# Patient Record
Sex: FEMALE | Race: WHITE | Employment: OTHER | URBAN - METROPOLITAN AREA
[De-identification: names, ages, dates, MRNs, and addresses within clinical notes are randomized per-mention and may not be internally consistent; named-entity substitution may affect disease eponyms.]

---

## 2017-02-13 ENCOUNTER — HOSPITAL ENCOUNTER (OUTPATIENT)
Dept: MAMMOGRAPHY | Age: 72
Discharge: HOME | End: 2017-02-13
Attending: SPECIALIST
Payer: MEDICARE

## 2017-02-13 DIAGNOSIS — N60.92 ATYPICAL DUCTAL HYPERPLASIA OF LEFT BREAST: ICD-10-CM

## 2017-02-13 PROCEDURE — 77066 DX MAMMO INCL CAD BI: CPT

## 2017-02-16 ENCOUNTER — OFFICE VISIT (OUTPATIENT)
Dept: SURGERY | Age: 72
End: 2017-02-16

## 2017-02-16 VITALS
HEIGHT: 63 IN | OXYGEN SATURATION: 97 % | TEMPERATURE: 95.2 F | RESPIRATION RATE: 18 BRPM | WEIGHT: 226.4 LBS | DIASTOLIC BLOOD PRESSURE: 61 MMHG | BODY MASS INDEX: 40.11 KG/M2 | SYSTOLIC BLOOD PRESSURE: 145 MMHG | HEART RATE: 86 BPM

## 2017-02-16 DIAGNOSIS — N60.91 ATYPICAL LOBULAR HYPERPLASIA OF RIGHT BREAST: ICD-10-CM

## 2017-02-16 DIAGNOSIS — N60.92 ATYPICAL DUCTAL HYPERPLASIA OF LEFT BREAST: Primary | ICD-10-CM

## 2017-02-16 NOTE — PATIENT INSTRUCTIONS
If you have any questions or concerns about today's appointment, the verbal and/or written instructions you were given for follow up care, please call our office at [phone].     [facility] - [address]    [phone] office  [phone]

## 2017-02-16 NOTE — PROGRESS NOTES
[facility] comes to the office for followup. The patient had a reduction mammoplasty done in October of 2016. The pathology report on that specimen revealed some atypical ductal hyperplasia on the left with a papilloma and on the right some atypical lobular hyperplasia with a papilloma. In my discussion with the pathologist it was felt that these were widely excised by B. Reduction mammoplasty. The patient has done well without any breast problems or lumps developing. She has minimal soreness at this time. Apparently some of the Vicryl stitches are \"spitting\". She did undergo a recent bilateral mammogram which failed to show any suspicious areas but just changes consistent with her recent surgery. Allergies   Allergen Reactions    Codeine Swelling    Cortisone Palpitations     Injections    Pcn [Penicillins] Hives       Current Outpatient Prescriptions   Medication Sig Dispense Refill    omeprazole (PRILOSEC) 10 mg capsule Take 10 mg by mouth daily.  aspirin delayed-release 81 mg tablet Take  by mouth daily.  NIFEdipine (PROCARDIA) 10 mg capsule Take 10 mg by mouth daily as needed.  hydrochlorothiazide (HYDRODIURIL) 50 mg tablet Take 50 mg by mouth daily.  FOLIC ACID PO Take  by mouth daily.  cholecalciferol, vitamin D3, (VITAMIN D3) 2,000 unit tab Take  by mouth daily.  olmesartan (BENICAR) 40 mg tablet Take 40 mg by mouth daily.  omega-3 fatty acids-vitamin e (FISH OIL) 1,000 mg cap Take 1 Cap by mouth daily.  multivitamin (ONE A DAY) tablet Take 1 Tab by mouth daily.  calcium 500 mg tab Take 500 mg by mouth daily.  NIFEdipine ER (NIFEDICAL XL) 30 mg ER tablet Take 30 mg by mouth daily.  solifenacin (VESICARE) 5 mg tablet Take 1 Tab by mouth daily.  90 Tab 3       Past Medical History   Diagnosis Date    Adverse effect of anesthesia      BLOOD PRESSURE DROPS    Colon polyps     Hypertension     Nausea & vomiting     Nocturia     Osteopenia  Stress incontinence        Past Surgical History   Procedure Laterality Date    Hx colonoscopy      Us guided core breast biopsy       right  benign    Hx hysterectomy      Hx bladder suspension      Hx knee arthroscopy       Right    Hx ankle fracture tx       left    Hx breast reduction Bilateral 10/19/2016     bilateral inferior pedicle BREAST REDUCTION performed by Yumiko Love MD at [address] Hx breast reconstruction Bilateral 11/2016    Hx oophorectomy          Family History   Problem Relation Age of Onset    Colon Cancer Mother     Hypertension Mother     Cancer Father      BONE AND TESTICULAR     Hypertension Father     Hypertension Brother        Social History     Social History    Marital status:      Spouse name: N/A    Number of children: N/A    Years of education: N/A     Occupational History    Not on file. Social History Main Topics    Smoking status: Never Smoker    Smokeless tobacco: Never Used    Alcohol use No    Drug use: No    Sexual activity: Yes     Partners: Male     Other Topics Concern    Not on file     Social History Narrative      The patient's review of systems has not changed since her initial visit a couple of months ago. PHYSICAL EXAM    Visit Vitals    /61 (BP 1 Location: Right arm, BP Patient Position: Sitting)    Pulse 86    Temp 95.2 °F (35.1 °C) (Oral)    Resp 18    Ht 5' 3\" (1.6 m)    Wt 102.7 kg (226 lb 6.4 oz)    SpO2 97%    BMI 40.1 kg/m2     Constitutional:  Well-developed, well-nourished, no acute distress. Head:  Head, eyes, ears, nose, throat within normal limits. Skin:  No suspicious moles or rashes. Neck:  No masses or adenopathy. The airway appears normal. Thyroid is not enlarged and there are no palpable thyroid nodules. Lungs:  Lungs are clear to auscultation and percussion. No respiratory distress. No chest wall tenderness.     Heart:  Heart is regular with no extra heart sounds or murmur heard. Breast Exam: The breasts are free of any discrete tenderness or masses. The skin and nipple areas appear normal. Both axillae are negative. The patient has several scars in both breast from her relatively recent reduction mammoplasty. There couple of small areas of redness in each of the incisions related to her \"spitting\" of Vicryl. But no masses or tenderness. Abdomen: The abdomen is soft and nontender without organomegaly or masses. Bowel sounds are active and of normal  pitch. There is no abdominal distention. No hernias are evident. Extremities:  No tenderness of the extremities and no significant swelling. Psych:  Alert and oriented. ASSESSMENT:Recent bilateral reduction mammoplasties with pathologic evaluation showing atypical ductal hyperplasia and papilloma on the left breast and atypical lobular hyperplasia with papilloma of the right breast. Areas were adequately excised. #2 hypertension. #3 history of colon polyps and family history of colon cancer    RECOMMENDATIONS:I will plan to see the patient in a year. We again discussed the increased risk factor of atypical ductal hyperplasia and the need for her to continue yearly mammograms, self breast examinations, and a yearly physician physical exam. 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 We had not previously discussed the possibility of taking tamoxifen to help reduce her risk. I will plan to discuss this with her. Bolivar Davila MD  Please note: This document has been produced using voice recognition software. Unrecognized errors in transcription may be present.

## 2017-02-16 NOTE — PROGRESS NOTES
I did call and talk to the patient about the prophylactic use of tamoxifen for high risk patients for breast cancer. Since she has ADH she does meet criteria for high risk. We discussed the function of tamoxifen, How it works, and side effects. She has had a previous hysterectomy. But I did mention the possibility of blood clots. The patient wants to think about this and will get back in touch with us.     Vj Conrad MD  2/16/2017  [id number]

## 2017-02-16 NOTE — PROGRESS NOTES
Patient is a 73 y.o. female who presents for a follow up breast exam for atypical ductal hyperplasia of the left breast & atypical lobular hyperplasia of the right breast s/p a breast reduction on 10/19/16. Patient denies any new breast changes or areas of concern today. 1. Have you been to the ER, urgent care clinic since your last visit? Hospitalized since your last visit? No    2. Have you seen or consulted any other health care providers outside of the [address] [place] [address] since your last visit? Include any pap smears or colon screening.  Yes, urologist.

## 2017-02-28 DIAGNOSIS — N60.91 ATYPICAL LOBULAR HYPERPLASIA OF RIGHT BREAST: Primary | ICD-10-CM

## 2017-02-28 RX ORDER — TAMOXIFEN CITRATE 20 MG/1
20 TABLET ORAL DAILY
Qty: 90 TAB | Refills: 3 | Status: SHIPPED | OUTPATIENT
Start: 2017-02-28 | End: 2018-03-06 | Stop reason: SDUPTHER

## 2017-08-08 ENCOUNTER — HOSPITAL ENCOUNTER (OUTPATIENT)
Dept: GENERAL RADIOLOGY | Age: 72
Discharge: HOME | End: 2017-08-08
Attending: INTERNAL MEDICINE
Payer: MEDICARE

## 2017-08-08 DIAGNOSIS — Z78.0 ASYMPTOMATIC MENOPAUSAL STATE: ICD-10-CM

## 2017-08-08 PROCEDURE — 77080 DXA BONE DENSITY AXIAL: CPT

## 2018-01-17 ENCOUNTER — TELEPHONE (OUTPATIENT)
Dept: SURGERY | Age: 73
End: 2018-01-17

## 2018-01-17 DIAGNOSIS — N60.92 ATYPICAL DUCTAL HYPERPLASIA OF LEFT BREAST: Primary | ICD-10-CM

## 2018-01-17 DIAGNOSIS — N60.91 ATYPICAL LOBULAR HYPERPLASIA OF RIGHT BREAST: ICD-10-CM

## 2018-01-17 DIAGNOSIS — Z12.31 ENCOUNTER FOR SCREENING MAMMOGRAM FOR HIGH-RISK PATIENT: ICD-10-CM

## 2018-02-14 ENCOUNTER — HOSPITAL ENCOUNTER (OUTPATIENT)
Dept: MAMMOGRAPHY | Age: 73
Discharge: HOME | End: 2018-02-14
Attending: SPECIALIST
Payer: MEDICARE

## 2018-02-14 DIAGNOSIS — Z12.31 ENCOUNTER FOR SCREENING MAMMOGRAM FOR HIGH-RISK PATIENT: ICD-10-CM

## 2018-02-14 DIAGNOSIS — N60.92 ATYPICAL DUCTAL HYPERPLASIA OF LEFT BREAST: ICD-10-CM

## 2018-02-14 DIAGNOSIS — N60.91 ATYPICAL LOBULAR HYPERPLASIA OF RIGHT BREAST: ICD-10-CM

## 2018-02-14 PROCEDURE — 77063 BREAST TOMOSYNTHESIS BI: CPT

## 2018-02-20 ENCOUNTER — OFFICE VISIT (OUTPATIENT)
Dept: SURGERY | Age: 73
End: 2018-02-20

## 2018-02-20 VITALS
SYSTOLIC BLOOD PRESSURE: 160 MMHG | DIASTOLIC BLOOD PRESSURE: 79 MMHG | BODY MASS INDEX: 40.96 KG/M2 | WEIGHT: 231.2 LBS | HEART RATE: 78 BPM | OXYGEN SATURATION: 98 % | TEMPERATURE: 98 F | HEIGHT: 63 IN

## 2018-02-20 DIAGNOSIS — I11.9 HYPERTENSIVE HEART DISEASE WITHOUT HEART FAILURE: ICD-10-CM

## 2018-02-20 DIAGNOSIS — N60.91 ATYPICAL LOBULAR HYPERPLASIA OF RIGHT BREAST: ICD-10-CM

## 2018-02-20 DIAGNOSIS — N60.92 ATYPICAL DUCTAL HYPERPLASIA OF LEFT BREAST: Primary | ICD-10-CM

## 2018-02-20 PROBLEM — E66.01 OBESITY, MORBID (HCC): Status: ACTIVE | Noted: 2018-02-20

## 2018-02-20 NOTE — PROGRESS NOTES
Seth Cobos underwent bilateral reduction mammoplasties in October 2016. In the left breast she was noted to have a focal area of atypical ductal hyperplasia with a papilloma. In the right breast she was noted to have an area of atypical lobular and ductal hyperplasia with a papilloma. I talked with the pathologist and it was felt that both these areas were widely excised. The patient has never had any other previous breast problems. Since that time I have encouraged the patient to do bilateral self breast examinations monthly. She has not noticed any new changes or lumps or tenderness. In addition I have encouraged her to have at least a yearly physical examination of her breasts as well as mammogram.  She had a 3D mammogram on 2/14/18 and it was interpreted as negative for any suspicious areas. After talking with the patient we decided because of her high risk status to place her on tamoxifen as a prophylactic treatment. She has been tolerating this well without any difficulty.     Allergies   Allergen Reactions    Codeine Swelling    Cortisone Palpitations     Injections    Pcn [Penicillins] Hives     Past Medical History:   Diagnosis Date    Adverse effect of anesthesia     BLOOD PRESSURE DROPS    Colon polyps     Hypertension     Nausea & vomiting     Nocturia     Osteopenia     Stress incontinence      Past Surgical History:   Procedure Laterality Date    HX ANKLE FRACTURE TX      left    HX BLADDER SUSPENSION      HX BREAST RECONSTRUCTION Bilateral 11/2016    HX BREAST REDUCTION Bilateral 10/19/2016    bilateral inferior pedicle BREAST REDUCTION performed by Jason Alexander MD at [facility] OR    HX COLONOSCOPY      HX HYSTERECTOMY      HX KNEE ARTHROSCOPY      Right    HX OOPHORECTOMY      US GUIDED CORE BREAST BIOPSY      right  benign     Social History     Social History    Marital status:      Spouse name: N/A    Number of children: N/A    Years of education: N/A     Social History Main Topics    Smoking status: Never Smoker    Smokeless tobacco: Never Used    Alcohol use No    Drug use: No    Sexual activity: Yes     Partners: Male     Other Topics Concern    None     Social History Narrative     The patient's review of systems has not changed. She continues to be on medication for hypovitaminosis D, hypertension, and GERD. Her blood pressure was up today but she has been taking her blood pressure medicines. PHYSICAL EXAM   syst  Visit Vitals    Temp 98 °F (36.7 °C) (Oral)    Ht 5' 3\" (1.6 m)    Wt 104.9 kg (231 lb 3.2 oz)    SpO2 98%    BMI 40.96 kg/m2          Constitutional:  Well-developed, well-nourished, no acute distress. Head:  Head, eyes, ears, nose, throat within normal limits. Skin:  No suspicious moles or rashes. Neck:  No masses or adenopathy. The airway appears normal. Thyroid is not enlarged and there are no palpable thyroid nodules. Lungs:  Lungs are clear to auscultation and percussion. No respiratory distress. No chest wall tenderness. Heart:  Heart is regular with no extra heart sounds or murmur heard. Breast Exam: The breasts are free of any discrete tenderness or masses  The skin and nipple areas appear normal. Both axillae are negative. The patient has scars from previous bilateral reduction mammoplasties. In the right breast mostly just to the lateral inferior part of the edge of the areola she does have some thickening. In the left breast she has much more thickening around the nipple areolar complex and just slightly lateral to that. I suspect this is all from scarring. Abdomen: The abdomen is soft and nontender without organomegaly or masses. Bowel sounds are active and of normal pitch. There is no abdominal distention. No hernias are evident. Extremities:  No tenderness of the extremities and no significant swelling.   Today she has 1-2+ edema pretibial.     Psych:  Alert and oriented. Assessment: Previous bilateral reduction mammoplasty in October 2016 with the finding of atypical ductal hyperplasia of the right breast and ADH and atypical lobular hyperplasia in the left breast.  Recent mammogram appear negative. Patient taking tamoxifen with the idea of keeping her on it for a total of 5 years. #2 hypertension. #3 GERD. Recommendations: Patient is to return in 1 year for further follow-up. She is to continue self breast examinations. I will plan to see her back in a year. The above was dictated with Chon. There may be unrecognized errors.     Roney Mortimer MD

## 2018-02-20 NOTE — PATIENT INSTRUCTIONS
If you have any questions or concerns about today's appointment, the verbal and/or written instructions you were given for follow up care, please call our office at [phone].     [facility] - [address]    [phone] office  [phone]

## 2018-02-20 NOTE — MR AVS SNAPSHOT
[facility] 
 
 
 [address] [address] [address] 
[phone] Patient: Hakeem Resendez MRN: [id number] Visit Information Date & Time Provider Department Dept. Phone Encounter #  
 2/20/2018  1:00 PM Niyah Solitario MD [address] [id number] Follow-up Instructions Return in about 1 year (around 2/20/2019). Upcoming Health Maintenance Date Due Hepatitis C Screening 1945 DTaP/Tdap/Td series (1 - Tdap) 9/11/1968 FOBT Q 1 YEAR AGE 50-75 9/11/1997 ZOSTER VACCINE AGE 60> 7/11/2007 GLAUCOMA SCREENING Q2Y 9/11/2012 Pneumococcal 65+ Low/Medium Risk (1 of 2 - PCV13) 9/11/2012 MEDICARE YEARLY EXAM 9/11/2012 Influenza Age 5 to Adult 8/1/2017 BREAST CANCER SCRN MAMMOGRAM 2/14/2020 Allergies as of 2/20/2018  Review Complete On: 2/20/2018 By: Niyah Solitario MD  
  
 Severity Noted Reaction Type Reactions Codeine  06/20/2013   Side Effect Swelling Cortisone  06/20/2013   Side Effect Palpitations Injections Pcn [Penicillins]  06/20/2013   Side Effect Hives Current Immunizations  Never Reviewed No immunizations on file. Not reviewed this visit You Were Diagnosed With   
  
 Codes Comments Atypical ductal hyperplasia of left breast    -  Primary ICD-10-CM: N60.92 
ICD-9-CM: 610.8 Atypical lobular hyperplasia of right breast     ICD-10-CM: N60.91 
ICD-9-CM: 610.8 Hypertensive heart disease without heart failure     ICD-10-CM: I11.9 ICD-9-CM: 402.90 Vitals Temp Height(growth percentile) Weight(growth percentile) SpO2 BMI OB Status 98 °F (36.7 °C) (Oral) 5' 3\" (1.6 m) 231 lb 3.2 oz (104.9 kg) 98% 40.96 kg/m2 Hysterectomy Smoking Status Never Smoker BMI and BSA Data Body Mass Index Body Surface Area 40.96 kg/m 2 2.16 m 2 Preferred Pharmacy Pharmacy Name Phone [address], [facility] [phone] Your Updated Medication List  
  
   
This list is accurate as of: 2/20/18  1:22 PM.  Always use your most recent med list.  
  
  
  
  
 aspirin delayed-release 81 mg tablet Take  by mouth daily. BENICAR 40 mg tablet Generic drug:  olmesartan Take 40 mg by mouth daily. calcium 500 mg Tab Take 500 mg by mouth daily. FISH OIL 1,000 mg Cap Generic drug:  omega-3 fatty acids-vitamin e Take 1 Cap by mouth daily. FOLIC ACID PO Take  by mouth daily. hydroCHLOROthiazide 50 mg tablet Commonly known as:  HYDRODIURIL Take 50 mg by mouth daily. multivitamin tablet Commonly known as:  ONE A DAY Take 1 Tab by mouth daily. * NIFEDICAL XL 30 mg ER tablet Generic drug:  NIFEdipine ER Take 30 mg by mouth daily. * NIFEdipine 10 mg capsule Commonly known as:  Therese Key Take 10 mg by mouth daily as needed. PriLOSEC 10 mg capsule Generic drug:  omeprazole Take 10 mg by mouth daily. * solifenacin 5 mg tablet Commonly known as:  Pageland Kobs Take 1 Tab by mouth daily. * solifenacin 10 mg tablet Commonly known as:  Pageland Kobs Take 1 Tab by mouth daily. tamoxifen 20 mg tablet Commonly known as:  NOLVADEX Take 1 Tab by mouth daily. tolterodine ER 4 mg ER capsule Commonly known as:  Adriana Kumarel Take 1 Cap by mouth daily. VITAMIN D3 2,000 unit Tab Generic drug:  cholecalciferol (vitamin D3) Take  by mouth daily. * Notice: This list has 4 medication(s) that are the same as other medications prescribed for you. Read the directions carefully, and ask your doctor or other care provider to review them with you. Follow-up Instructions Return in about 1 year (around 2/20/2019). Patient Instructions If you have any questions or concerns about today's appointment, the verbal and/or written instructions you were given for follow up care, please call our office at [phone]. [facility] - [facility] [address] [place] [address], [address] [place], [place] [phone] office [phone] Introducing [facility]! Louise Feng introduces "[employer] patient portal. Now you can access parts of your medical record, email your doctor's office, and request medication refills online. 1. In your internet browser, go to [email or web address] 2. Click on the First Time User? Click Here link in the Sign In box. You will see the New Member Sign Up page. 3. Enter your "[employer] Access Code exactly as it appears below. You will not need to use this code after youve completed the sign-up process. If you do not sign up before the expiration date, you must request a new code. · "[employer] Access Code: [email or web address] Expires: 4/18/2018 12:56 PM 
 
4. Enter the last four digits of your Social Security Number (xxxx) and Date of Birth (mm/dd/yyyy) as indicated and click Submit. You will be taken to the next sign-up page. 5. Create a "[employer] ID. This will be your "[employer] login ID and cannot be changed, so think of one that is secure and easy to remember. 6. Create a "[employer] password. You can change your password at any time. 7. Enter your Password Reset Question and Answer. This can be used at a later time if you forget your password. 8. Enter your e-mail address. You will receive e-mail notification when new information is available in [address] 9. Click Sign Up. You can now view and download portions of your medical record. 10. Click the Download Summary menu link to download a portable copy of your medical information. If you have questions, please visit the Frequently Asked Questions section of the "[employer] website. Remember, "[employer] is NOT to be used for urgent needs. For medical emergencies, dial 911. Now available from your iPhone and Android! Please provide this summary of care documentation to your next provider. Your primary care clinician is listed as NONE. If you have any questions after today's visit, please call [phone].

## 2018-02-20 NOTE — PROGRESS NOTES
Chief Complaint   Patient presents with    Follow-up     follow up breast exam atypical ductal hyperplasia of left breast and atypical lobular hyperplasia of right breast 10/2016 mammogram images done 1/17/18. No pain associated today. 1. Have you been to the ER, urgent care clinic since your last visit? Hospitalized since your last visit? No    2. Have you seen or consulted any other health care providers outside of the [address] [place] [address] since your last visit? Include any pap smears or colon screening.  No

## 2018-03-06 DIAGNOSIS — N60.91 ATYPICAL LOBULAR HYPERPLASIA OF RIGHT BREAST: ICD-10-CM

## 2018-03-06 RX ORDER — TAMOXIFEN CITRATE 20 MG/1
20 TABLET ORAL DAILY
Qty: 90 TAB | Refills: 3 | Status: SHIPPED | OUTPATIENT
Start: 2018-03-06 | End: 2019-02-01 | Stop reason: SDUPTHER

## 2018-05-15 PROBLEM — I10 ESSENTIAL HYPERTENSION: Status: ACTIVE | Noted: 2018-01-19

## 2018-05-15 PROBLEM — D05.12 DUCTAL CARCINOMA IN SITU (DCIS) OF BOTH BREASTS: Status: ACTIVE | Noted: 2018-01-19

## 2018-05-15 PROBLEM — E78.5 DYSLIPIDEMIA: Status: ACTIVE | Noted: 2018-01-19

## 2018-05-15 PROBLEM — D05.11 DUCTAL CARCINOMA IN SITU (DCIS) OF BOTH BREASTS: Status: ACTIVE | Noted: 2018-01-19

## 2018-09-10 RX ORDER — LANOLIN ALCOHOL/MO/W.PET/CERES
2500 CREAM (GRAM) TOPICAL DAILY
COMMUNITY

## 2018-09-10 RX ORDER — CHOLECALCIFEROL TAB 125 MCG (5000 UNIT) 125 MCG
5000 TAB ORAL DAILY
COMMUNITY

## 2018-09-10 NOTE — PERIOP NOTES
PAT - SURGICAL PRE-ADMISSION INSTRUCTIONS    NAME:  Amy BENAVIDEZ'S DATE:  9/10/2018    SURGERY DATE:  9/12/2018                                  SURGERY ARRIVAL TIME:   1300    1. Do NOT eat or drink anything, including candy or gum, after MIDNIGHT on 9/11/18 , unless you have specific instructions from your Surgeon or Anesthesia Provider to do so. 2. No smoking on the day of surgery. 3. No alcohol 24 hours prior to the day of surgery. 4. No recreational drugs for one week prior to the day of surgery. 5. Leave all valuables, including money/purse, at home. 6. Remove all jewelry, nail polish, makeup (including mascara); no lotions, powders, deodorant, or perfume/cologne/after shave. 7. Glasses/Contact lenses and Dentures may be worn to the hospital.  They will be removed prior to surgery. 8. Call your doctor if symptoms of a cold or illness develop within 24 ours prior to surgery. 9. AN ADULT MUST DRIVE YOU HOME AFTER OUTPATIENT SURGERY. 10. If you are having an OUTPATIENT procedure, please make arrangements for a responsible adult to be with you for 24 hours after your surgery. 11. If you are admitted to the hospital, you will be assigned to a bed after surgery is complete. Normally a family member will not be able to see you until you are in your assigned bed. 15. Family is encouraged to accompany you to the hospital.  We ask visitors in the treatment area to be limited to ONE person at a time to ensure patient privacy. EXCEPTIONS WILL BE MADE AS NEEDED. 15. Children under 12 are discouraged from entering the treatment area and need to be supervised by an adult when in the waiting room. Special Instructions:    Complete bowel prep per MD instructions. Patient Prep:    shower with anti-bacterial soap    These surgical instructions were reviewed with PATIENT during the PAT PHONE CALL. Directions:   On the morning of surgery, please go to the [facility]. Enter the building from the [facility] entrance, 1st floor (next to the Emergency Room entrance). Take the elevator to the 2nd floor. Sign in at the Registration Desk.     If you have any questions and/or concerns, please do not hesitate to call:  (Prior to the day of surgery)  [facility] [place] unit:  [phone]  (Day of surgery)  [facility] unit:  [phone]

## 2018-09-11 ENCOUNTER — ANESTHESIA EVENT (OUTPATIENT)
Dept: ENDOSCOPY | Age: 73
End: 2018-09-11
Payer: MEDICARE

## 2018-09-12 ENCOUNTER — HOSPITAL ENCOUNTER (OUTPATIENT)
Age: 73
Setting detail: OUTPATIENT SURGERY
Discharge: HOME | End: 2018-09-12
Attending: INTERNAL MEDICINE | Admitting: INTERNAL MEDICINE
Payer: MEDICARE

## 2018-09-12 ENCOUNTER — ANESTHESIA (OUTPATIENT)
Dept: ENDOSCOPY | Age: 73
End: 2018-09-12
Payer: MEDICARE

## 2018-09-12 VITALS
DIASTOLIC BLOOD PRESSURE: 57 MMHG | RESPIRATION RATE: 18 BRPM | BODY MASS INDEX: 40.25 KG/M2 | HEART RATE: 79 BPM | OXYGEN SATURATION: 95 % | HEIGHT: 63 IN | SYSTOLIC BLOOD PRESSURE: 113 MMHG | WEIGHT: 227.19 LBS

## 2018-09-12 PROBLEM — Z80.0 FAMILY HISTORY OF COLON CANCER IN MOTHER: Status: ACTIVE | Noted: 2018-09-12

## 2018-09-12 PROBLEM — K92.1 HEMATOCHEZIA: Status: ACTIVE | Noted: 2018-09-12

## 2018-09-12 PROCEDURE — 77030031670 HC DEV INFL ENDOTEK BIG60 MRTM -B: Performed by: INTERNAL MEDICINE

## 2018-09-12 PROCEDURE — 74011250636 HC RX REV CODE- 250/636

## 2018-09-12 PROCEDURE — 88305 TISSUE EXAM BY PATHOLOGIST: CPT | Performed by: INTERNAL MEDICINE

## 2018-09-12 PROCEDURE — 77030038604 HC SNR ENDO EXACTO USEN -B: Performed by: INTERNAL MEDICINE

## 2018-09-12 PROCEDURE — 74011250636 HC RX REV CODE- 250/636: Performed by: NURSE ANESTHETIST, CERTIFIED REGISTERED

## 2018-09-12 PROCEDURE — 76060000031 HC ANESTHESIA FIRST 0.5 HR: Performed by: INTERNAL MEDICINE

## 2018-09-12 PROCEDURE — 76040000019: Performed by: INTERNAL MEDICINE

## 2018-09-12 RX ORDER — SODIUM CHLORIDE 9 MG/ML
25 INJECTION, SOLUTION INTRAVENOUS CONTINUOUS
Status: CANCELLED | OUTPATIENT
Start: 2018-09-12 | End: 2018-09-12

## 2018-09-12 RX ORDER — PROPOFOL 10 MG/ML
INJECTION, EMULSION INTRAVENOUS AS NEEDED
Status: DISCONTINUED | OUTPATIENT
Start: 2018-09-12 | End: 2018-09-12 | Stop reason: HOSPADM

## 2018-09-12 RX ORDER — DEXTROMETHORPHAN/PSEUDOEPHED 2.5-7.5/.8
1.2 DROPS ORAL
Status: CANCELLED | OUTPATIENT
Start: 2018-09-12

## 2018-09-12 RX ORDER — SODIUM CHLORIDE 0.9 % (FLUSH) 0.9 %
5-10 SYRINGE (ML) INJECTION AS NEEDED
Status: CANCELLED | OUTPATIENT
Start: 2018-09-12 | End: 2018-09-12

## 2018-09-12 RX ORDER — PROPOFOL 10 MG/ML
INJECTION, EMULSION INTRAVENOUS
Status: DISCONTINUED | OUTPATIENT
Start: 2018-09-12 | End: 2018-09-12 | Stop reason: HOSPADM

## 2018-09-12 RX ORDER — SODIUM CHLORIDE, SODIUM LACTATE, POTASSIUM CHLORIDE, CALCIUM CHLORIDE 600; 310; 30; 20 MG/100ML; MG/100ML; MG/100ML; MG/100ML
25 INJECTION, SOLUTION INTRAVENOUS CONTINUOUS
Status: DISCONTINUED | OUTPATIENT
Start: 2018-09-12 | End: 2018-09-12 | Stop reason: HOSPADM

## 2018-09-12 RX ORDER — SODIUM CHLORIDE 0.9 % (FLUSH) 0.9 %
5-10 SYRINGE (ML) INJECTION EVERY 8 HOURS
Status: CANCELLED | OUTPATIENT
Start: 2018-09-12 | End: 2018-09-12

## 2018-09-12 RX ORDER — FAMOTIDINE 20 MG/1
20 TABLET, FILM COATED ORAL ONCE
Status: DISCONTINUED | OUTPATIENT
Start: 2018-09-12 | End: 2018-09-12 | Stop reason: HOSPADM

## 2018-09-12 RX ORDER — LIDOCAINE HYDROCHLORIDE 20 MG/ML
INJECTION, SOLUTION EPIDURAL; INFILTRATION; INTRACAUDAL; PERINEURAL AS NEEDED
Status: DISCONTINUED | OUTPATIENT
Start: 2018-09-12 | End: 2018-09-12 | Stop reason: HOSPADM

## 2018-09-12 RX ADMIN — PROPOFOL 80 MG: 10 INJECTION, EMULSION INTRAVENOUS at 14:43

## 2018-09-12 RX ADMIN — SODIUM CHLORIDE, SODIUM LACTATE, POTASSIUM CHLORIDE, AND CALCIUM CHLORIDE 25 ML/HR: 600; 310; 30; 20 INJECTION, SOLUTION INTRAVENOUS at 14:22

## 2018-09-12 RX ADMIN — PROPOFOL 40 MG: 10 INJECTION, EMULSION INTRAVENOUS at 14:47

## 2018-09-12 RX ADMIN — PROPOFOL 30 MG: 10 INJECTION, EMULSION INTRAVENOUS at 14:59

## 2018-09-12 RX ADMIN — LIDOCAINE HYDROCHLORIDE 100 MG: 20 INJECTION, SOLUTION EPIDURAL; INFILTRATION; INTRACAUDAL; PERINEURAL at 14:43

## 2018-09-12 RX ADMIN — PROPOFOL 160 MCG/KG/MIN: 10 INJECTION, EMULSION INTRAVENOUS at 14:43

## 2018-09-12 NOTE — IP AVS SNAPSHOT
[address] [address] [address] 
[phone] Patient: Rubia Mclain MRN: [id number] [facility]:3/44/3093 About your hospitalization You were admitted on:  September 12, 2018 You last received care in the:  [facility] PHASE 2 RECOVERY You were discharged on:  September 12, 2018 Why you were hospitalized Your primary diagnosis was:  Family History Of Colon Cancer In Mother Your diagnoses also included:  Hematochezia Follow-up Information Follow up With Details Comments Contact Info Naseem Ayala MD   [address] [address] 
[phone] Discharge Orders None A check cheryl indicates which time of day the medication should be taken. My Medications CHANGE how you take these medications Instructions Each Dose to Equal  
 Morning Noon Evening Bedtime  
 solifenacin 10 mg tablet Commonly known as:  Gayl Prime What changed:  how much to take Your last dose was: Your next dose is: Take 1 Tab by mouth daily. 10 mg  
    
   
   
   
  
 tamoxifen 20 mg tablet Commonly known as:  NOLVADEX What changed:  when to take this Your last dose was: Your next dose is: Take 1 Tab by mouth daily. 20 mg CONTINUE taking these medications Instructions Each Dose to Equal  
 Morning Noon Evening Bedtime  
 aspirin delayed-release 81 mg tablet Your last dose was: Your next dose is: Take  by mouth daily. BENICAR 40 mg tablet Generic drug:  olmesartan Your last dose was: Your next dose is: Take 40 mg by mouth nightly. 40 mg  
    
   
   
   
  
 calcium 500 mg Tab Your last dose was: Your next dose is: Take 500 mg by mouth two (2) times a day. 500 mg  
    
   
   
   
  
 cyanocobalamin 1,000 mcg tablet Your last dose was: Your next dose is: Take 1,000 mcg by mouth daily. 1000 mcg FISH OIL 1,000 mg Cap Generic drug:  omega-3 fatty acids-vitamin e Your last dose was: Your next dose is: Take 1 Cap by mouth daily. 1 Cap  
    
   
   
   
  
 hydroCHLOROthiazide 50 mg tablet Commonly known as:  HYDRODIURIL Your last dose was: Your next dose is: Take 50 mg by mouth daily. 50 mg  
    
   
   
   
  
 multivitamin tablet Commonly known as:  ONE A DAY Your last dose was: Your next dose is: Take 1 Tab by mouth daily. 1 Tab  
    
   
   
   
  
 OCUVITE PO Your last dose was: Your next dose is: Take  by mouth. omeprazole 20 mg capsule Commonly known as:  PRILOSEC Your last dose was: Your next dose is: VITAMIN D3 5,000 unit Tab tablet Generic drug:  cholecalciferol (VITAMIN D3) Your last dose was: Your next dose is: Take  by mouth daily. Discharge Instructions RECOMMENDATIONS: 
1.  Start Methylcellulose tabs 2 in AM and 2 in PM daily with 6 ounces of beverage for each tablet to reduce the risk of complications from diverticulosis or Recurrent rectal bleeding from anal irritation 2. Call me in 2-3 weeks for polyp biopsy results if not received beforehand. If adenoma or hyperplastic then repeat colonoscopy in 5 years b/o mother with colon cancer 3. Avoid all NSAIDs--ibuprofen, advil, aleve, orudis, naproxen etc to reduce the risk of recurrent rectal bleeding Colonoscopy: What to Expect at [facility] Your Recovery After you have a colonoscopy, you will stay at the clinic for 1 to 2 hours until the medicines wear off. Then you can go home.  But you will need to arrange for a ride. Your doctor will tell you when you can eat and do your other usual activities. Your doctor will talk to you about when you will need your next colonoscopy. Your doctor can help you decide how often you need to be checked. This will depend on the results of your test and your risk for colorectal cancer. After the test, you may be bloated or have gas pains. You may need to pass gas. If a biopsy was done or a polyp was removed, you may have streaks of blood in your stool (feces) for a few days. Problems such as heavy rectal bleeding may not occur until several weeks after the test. This isn't common. But it can happen after polyps are removed. This care sheet gives you a general idea about how long it will take for you to recover. But each person recovers at a different pace. Follow the steps below to get better as quickly as possible. How can you care for yourself at home? Activity 
  · Rest when you feel tired.  
  · You can do your normal activities when it feels okay to do so. Diet 
  · Follow your doctor's directions for eating.  
  · Unless your doctor has told you not to, drink plenty of fluids. This helps to replace the fluids that were lost during the colon prep.  
  · Do not drink alcohol. Medicines 
  · Your doctor will tell you if and when you can restart your medicines. He or she will also give you instructions about taking any new medicines.  
  · If you take blood thinners, such as warfarin (Coumadin), clopidogrel (Plavix), or aspirin, be sure to talk to your doctor. He or she will tell you if and when to start taking those medicines again. Make sure that you understand exactly what your doctor wants you to do.  
  · If polyps were removed or a biopsy was done during the test, your doctor may tell you not to take aspirin or other anti-inflammatory medicines for a few days. These include ibuprofen (Advil, Motrin) and naproxen (Aleve). Other instructions   · For your safety, do not drive or operate machinery until the medicine wears off and you can think clearly. Your doctor may tell you not to drive or operate machinery until the day after your test.  
  · Do not sign legal documents or make major decisions until the medicine wears off and you can think clearly. The anesthesia can make it hard for you to fully understand what you are agreeing to. Follow-up care is a key part of your treatment and safety. Be sure to make and go to all appointments, and call your doctor if you are having problems. It's also a good idea to know your test results and keep a list of the medicines you take. When should you call for help? Call 911 anytime you think you may need emergency care. For example, call if: 
  · You passed out (lost consciousness).  
  · You pass maroon or bloody stools.  
  · You have trouble breathing.  
 Call your doctor now or seek immediate medical care if: 
  · You have pain that does not get better after you take pain medicine.  
  · You are sick to your stomach or cannot drink fluids.  
  · You have new or worse belly pain.  
  · You have blood in your stools.  
  · You have a fever.  
  · You cannot pass stools or gas.  
 Watch closely for changes in your health, and be sure to contact your doctor if you have any problems. Where can you learn more? Go to http://nash-mitesh.info/. Enter E264 in the search box to learn more about \"Colonoscopy: What to Expect at Home. \" Current as of: May 12, 2017 Content Version: 11.7 © 4673-1183 [employer]. Care instructions adapted under license by [employer] (which disclaims liability or warranty for this information). If you have questions about a medical condition or this instruction, always ask your healthcare professional. [address] [address] any warranty or liability for your use of this information. DISCHARGE SUMMARY from Nurse PATIENT INSTRUCTIONS: 
 
After general anesthesia or intravenous sedation, for 24 hours or while taking prescription Narcotics: · Limit your activities · Do not drive and operate hazardous machinery · Do not make important personal or business decisions · Do  not drink alcoholic beverages · If you have not urinated within 8 hours after discharge, please contact your surgeon on call. Report the following to your surgeon: 
· Excessive pain, swelling, redness or odor of or around the surgical area · Temperature over 100.5 · Nausea and vomiting lasting longer than 4 hours or if unable to take medications · Any signs of decreased circulation or nerve impairment to extremity: change in color, persistent  numbness, tingling, coldness or increase pain · Any questions What to do at Home: 
Recommended activity: Activity as tolerated and no driving for today These are general instructions for a healthy lifestyle: No smoking/ No tobacco products/ Avoid exposure to second hand smoke Surgeon General's Warning:  Quitting smoking now greatly reduces serious risk to your health. Obesity, smoking, and sedentary lifestyle greatly increases your risk for illness A healthy diet, regular physical exercise & weight monitoring are important for maintaining a healthy lifestyle You may be retaining fluid if you have a history of heart failure or if you experience any of the following symptoms:  Weight gain of 3 pounds or more overnight or 5 pounds in a week, increased swelling in our hands or feet or shortness of breath while lying flat in bed. Please call your doctor as soon as you notice any of these symptoms; do not wait until your next office visit. Recognize signs and symptoms of STROKE: 
 
F-face looks uneven A-arms unable to move or move unevenly S-speech slurred or non-existent T-time-call 911 as soon as signs and symptoms begin-DO NOT go  
 Back to bed or wait to see if you get better-TIME IS BRAIN. Warning Signs of HEART ATTACK Call 911 if you have these symptoms: 
? Chest discomfort. Most heart attacks involve discomfort in the center of the chest that lasts more than a few minutes, or that goes away and comes back. It can feel like uncomfortable pressure, squeezing, fullness, or pain. ? Discomfort in other areas of the upper body. Symptoms can include pain or discomfort in one or both arms, the back, neck, jaw, or stomach. ? Shortness of breath with or without chest discomfort. ? Other signs may include breaking out in a cold sweat, nausea, or lightheadedness. Don't wait more than five minutes to call [address]! Fast action can save your life. Calling 911 is almost always the fastest way to get lifesaving treatment. Emergency Medical Services staff can begin treatment when they arrive  up to an hour sooner than if someone gets to the hospital by car. The discharge information has been reviewed with the patient. The patient verbalized understanding. Discharge medications reviewed with the patient and appropriate educational materials and side effects teaching were provided. Patient armband removed and given to patient to take home. Patient was informed of the privacy risks if armband lost or stolen. Introducing [facility]! Majo Trinidad introduces [facility] patient portal. Now you can access parts of your medical record, email your doctor's office, and request medication refills online. 1. In your internet browser, go to [email or web address] 2. Click on the First Time User? Click Here link in the Sign In box. You will see the New Member Sign Up page. 3. Enter your [facility] Access Code exactly as it appears below. You will not need to use this code after youve completed the sign-up process. If you do not sign up before the expiration date, you must request a new code. · [employer] Access Code: [email or web address] Expires: 12/10/2018 11:04 AM 
 
4. Enter the last four digits of your Social Security Number (xxxx) and Date of Birth (mm/dd/yyyy) as indicated and click Submit. You will be taken to the next sign-up page. 5. Create a L[employer] ID. This will be your [employer] login ID and cannot be changed, so think of one that is secure and easy to remember. 6. Create a [employer] password. You can change your password at any time. 7. Enter your Password Reset Question and Answer. This can be used at a later time if you forget your password. 8. Enter your e-mail address. You will receive e-mail notification when new information is available in [address] 9. Click Sign Up. You can now view and download portions of your medical record. 10. Click the Download Summary menu link to download a portable copy of your medical information. If you have questions, please visit the Frequently Asked Questions section of the [employer] website. Remember, [employer] is NOT to be used for urgent needs. For medical emergencies, dial 911. Now available from your iPhone and Android! Introducing Rayshawn Aguilar As a [facility] patient, I wanted to make you aware of our electronic visit tool called Rayshawn [employer]. [facility] 24/7 allows you to connect within minutes with a medical provider 24 hours a day, seven days a week via a mobile device or tablet or logging into a secure website from your computer. You can access Rayshawn Penafin from anywhere in the [place]. A virtual visit might be right for you when you have a simple condition and feel like you just dont want to get out of bed, or cant get away from work for an appointment, when your regular [facility] provider is not available (evenings, weekends or holidays), or when youre out of town and need minor care.   Electronic visits cost only $49 and if the [facility] 24/7 provider determines a prescription is needed to treat your condition, one can be electronically transmitted to a nearby pharmacy*. Please take a moment to enroll today if you have not already done so. The enrollment process is free and takes just a few minutes. To enroll, please download the [employer] 24/7 rashid to your tablet or phone, or visit [email or web address] to enroll on your computer. And, as an [address] [place] [address] patient with a [employer] account, the results of your visits will be scanned into your electronic medical record and your primary care provider will be able to view the scanned results. We urge you to continue to see your regular [employer] provider for your ongoing medical care. And while your primary care provider may not be the one available when you seek a [employer] virtual visit, the peace of mind you get from getting a real diagnosis real time can be priceless. For more information on [employer], view our Frequently Asked Questions (FAQs) at [email or web address]. Sincerely, 
 
Dania Rodriguez MD 
Chief Medical Officer [facility] Brenda Wilkinson *:  certain medications cannot be prescribed via [employer] Providers Seen During Your Hospitalization Provider Specialty Primary office phone Eri Cervantes MD Gastroenterology [phone] Your Primary Care Physician (PCP) Primary Care Physician Office Phone Office Fax Media Juli [phone] You are allergic to the following Allergen Reactions Codeine Swelling Cortisone Palpitations Injections Pcn (Penicillins) Hives Recent Documentation Height Weight Breastfeeding? BMI OB Status Smoking Status 1.6 m 103.1 kg No 40.24 kg/m2 Hysterectomy Never Smoker Emergency Contacts Name Discharge Info Relation Home Work Mobile Jerald Tuttle DISCHARGE CAREGIVER [3] Spouse [3] [phone] Patient Belongings The following personal items are in your possession at time of discharge: 
  Dental Appliances: None  Visual Aid: Glasses Please provide this summary of care documentation to your next provider. Signatures-by signing, you are acknowledging that this After Visit Summary has been reviewed with you and you have received a copy. Patient Signature:  ____________________________________________________________ Date:  ____________________________________________________________  
  
[facility] Provider Signature:  ____________________________________________________________ Date:  ____________________________________________________________

## 2018-09-12 NOTE — ANESTHESIA PREPROCEDURE EVALUATION
Anesthetic History     PONV          Review of Systems / Medical History  Patient summary reviewed, nursing notes reviewed and pertinent labs reviewed    Pulmonary  Within defined limits                 Neuro/Psych   Within defined limits           Cardiovascular    Hypertension: well controlled              Exercise tolerance: >4 METS  Comments: ECG SR RBBB, ECHO Normal EF no significant valve dx.    GI/Hepatic/Renal     GERD: well controlled           Endo/Other        Morbid obesity and arthritis     Other Findings              Physical Exam    Airway  Mallampati: II  TM Distance: 4 - 6 cm  Neck ROM: normal range of motion   Mouth opening: Normal     Cardiovascular  Regular rate and rhythm,  S1 and S2 normal,  no murmur, click, rub, or gallop             Dental  No notable dental hx       Pulmonary  Breath sounds clear to auscultation               Abdominal  Abdominal exam normal       Other Findings            Anesthetic Plan    ASA: 3  Anesthesia type: MAC            Anesthetic plan and risks discussed with: Patient

## 2018-09-12 NOTE — PERIOP NOTES
Phase 2 Recovery Summary  Patient arrived to Phase 2 at 1505  Report received from [address]:    09/10/18 1429 09/12/18 1358 09/12/18 1505 09/12/18 1507   BP:   113/57 113/57   Pulse:  84 80 79   Resp:  18 18    SpO2:  95% 95% 95%   Weight: 103.4 kg (228 lb) 103.1 kg (227 lb 3 oz)     Height: 5' 3.75\" (1.619 m) 5' 3\" (1.6 m)         oriented to time, place, person and situation    Lines and Drains  Peripheral Intravenous Line:   Peripheral IV 09/12/18 Right Hand (Active)   Site Assessment Clean, dry, & intact 9/12/2018  3:14 PM   Phlebitis Assessment 0 9/12/2018  3:14 PM   Infiltration Assessment 0 9/12/2018  3:14 PM   Dressing Status Clean, dry, & intact 9/12/2018  3:14 PM   Dressing Type Tape;Transparent 9/12/2018  3:14 PM   Hub Color/Line Status Blue; Infusing 9/12/2018  3:14 PM       Wound  Wound Breast Left;Right (Active)   Number of days:693       Wound Vagina (Active)   Number of days:679          1- Dr. Evaristo Baldwin at patient bedside.      Patient discharged to home with , Gabriela Fernandez  at [address] [facility]

## 2018-09-12 NOTE — PROCEDURES
Colonoscopy Report    Patient: Cindy Ye MRN: [id number]  SSN: [id number]    YOB: 1945  Age: 72 y.o. Sex: female      Date of Procedure: 9/12/2018    IMPRESSION:  1. The preparation was excellent and the terminal ileum was normal for 10 cm  2.  5 mm sessile polyp in distal transverse colon removed with cold snare  3. Moderate diverticulosis sigmoid and left colon  4. No significant anal pathology    RECOMMENDATIONS:  1.  Start Methylcellulose tabs 2 in AM and 2 in PM daily with 6 ounces of beverage for each tablet to reduce the risk of complications from diverticulosis or  Recurrent rectal bleeding from anal irritation  2. Call me in 2-3 weeks for polyp biopsy results if not received beforehand. If adenoma or hyperplastic then repeat colonoscopy in 5 years b/o mother with colon cancer  3. Avoid all NSAIDs--ibuprofen, advil, aleve, orudis, naproxen etc to reduce the risk of recurrent rectal bleeding    Indication:  Hematochezia--painless, and mother with colon cancer  History: The history and physical exam were reviewed and updated. Procedure Performed: Colonoscopy polypectomy (cold snare)  Endoscopist: Mylene Boswell MD  Assistant: Endoscopy Technician-1: Jose Love  Endoscopy RN-1: Kemal Servin RN   ASA: ASA 2 - Patient with mild systemic disease with no functional limitations  Mallampati Score: II (soft palate, uvula, fauces visible)  Sedation:  MAC anesthesia  Endoscope: [id number]  Extent of Examination:terminal ileum  Quality of Preparation: excellent    Technique: The procedure was discussed with the patient including risks, benefits, alternatives including risks of IV sedation, bleeding, perforation and missed polyp. A safety timeout was performed. The patient was placed in left lateral position. The patient was given incremental doses of IV medication to achieve moderate  sedation.   The patients vital signs were monitored at all times including heart rate, rhythm, blood pressure and oxygen saturation. When adequate sedation was achieved a perianal inspection and a digital rectal exam were performed. The video colonoscope was introduced into the rectum and advanced under direct vision up to the cecum and 10 cm into the terminal ileum. The cecum was identified by IC valve, appendiceal orifice and convergence of the tineal folds. Careful examination of the colonic mucosa was then performed on slow withdrawal of the endoscope. Retroflexion was performed in the rectum and the distal rectum visualized as was the anal canal.  The patient tolerated the procedure very well and was transferred to recovery area. Findings:  See impression above  EBL: minimal  Specimen:   ID Type Source Tests Collected by Time Destination   1 : (cold snare) Distal Transverse Colon Polyp Preservative Colon, Transverse  Lavada MD Rai 9/12/2018 1450 Pathology       [facility].  MD Alcides, Dennise Leary, [facility]  September 12, 2018  3:02 PM

## 2018-09-12 NOTE — DISCHARGE INSTRUCTIONS
RECOMMENDATIONS:  1.  Start Methylcellulose tabs 2 in AM and 2 in PM daily with 6 ounces of beverage for each tablet to reduce the risk of complications from diverticulosis or  Recurrent rectal bleeding from anal irritation  2. Call me in 2-3 weeks for polyp biopsy results if not received beforehand. If adenoma or hyperplastic then repeat colonoscopy in 5 years b/o mother with colon cancer  3. Avoid all NSAIDs--ibuprofen, advil, aleve, orudis, naproxen etc to reduce the risk of recurrent rectal bleeding     Colonoscopy: What to Expect at [address] [place] [address]  After you have a colonoscopy, you will stay at the clinic for 1 to 2 hours until the medicines wear off. Then you can go home. But you will need to arrange for a ride. Your doctor will tell you when you can eat and do your other usual activities. Your doctor will talk to you about when you will need your next colonoscopy. Your doctor can help you decide how often you need to be checked. This will depend on the results of your test and your risk for colorectal cancer. After the test, you may be bloated or have gas pains. You may need to pass gas. If a biopsy was done or a polyp was removed, you may have streaks of blood in your stool (feces) for a few days. Problems such as heavy rectal bleeding may not occur until several weeks after the test. This isn't common. But it can happen after polyps are removed. This care sheet gives you a general idea about how long it will take for you to recover. But each person recovers at a different pace. Follow the steps below to get better as quickly as possible. How can you care for yourself at home? Activity    · Rest when you feel tired.     · You can do your normal activities when it feels okay to do so. Diet    · Follow your doctor's directions for eating.     · Unless your doctor has told you not to, drink plenty of fluids.  This helps to replace the fluids that were lost during the colon prep.     · Do not drink alcohol. Medicines    · Your doctor will tell you if and when you can restart your medicines. He or she will also give you instructions about taking any new medicines.     · If you take blood thinners, such as warfarin (Coumadin), clopidogrel (Plavix), or aspirin, be sure to talk to your doctor. He or she will tell you if and when to start taking those medicines again. Make sure that you understand exactly what your doctor wants you to do.     · If polyps were removed or a biopsy was done during the test, your doctor may tell you not to take aspirin or other anti-inflammatory medicines for a few days. These include ibuprofen (Advil, Motrin) and naproxen (Aleve). Other instructions    · For your safety, do not drive or operate machinery until the medicine wears off and you can think clearly. Your doctor may tell you not to drive or operate machinery until the day after your test.     · Do not sign legal documents or make major decisions until the medicine wears off and you can think clearly. The anesthesia can make it hard for you to fully understand what you are agreeing to. Follow-up care is a key part of your treatment and safety. Be sure to make and go to all appointments, and call your doctor if you are having problems. It's also a good idea to know your test results and keep a list of the medicines you take. When should you call for help? Call 911 anytime you think you may need emergency care.  For example, call if:    · You passed out (lost consciousness).     · You pass maroon or bloody stools.     · You have trouble breathing.    Call your doctor now or seek immediate medical care if:    · You have pain that does not get better after you take pain medicine.     · You are sick to your stomach or cannot drink fluids.     · You have new or worse belly pain.     · You have blood in your stools.     · You have a fever.     · You cannot pass stools or gas.    Watch closely for changes in your health, and be sure to contact your doctor if you have any problems. Where can you learn more? Go to http://nash-mitesh.info/. Enter E264 in the search box to learn more about \"Colonoscopy: What to Expect at Home. \"  Current as of: May 12, 2017  Content Version: 11.7  © 0581-7483 [employer]. Care instructions adapted under license by [employer] (which disclaims liability or warranty for this information). If you have questions about a medical condition or this instruction, always ask your healthcare professional. [place] [address] any warranty or liability for your use of this information. DISCHARGE SUMMARY from Nurse    PATIENT INSTRUCTIONS:    After general anesthesia or intravenous sedation, for 24 hours or while taking prescription Narcotics:  · Limit your activities  · Do not drive and operate hazardous machinery  · Do not make important personal or business decisions  · Do  not drink alcoholic beverages  · If you have not urinated within 8 hours after discharge, please contact your surgeon on call. Report the following to your surgeon:  · Excessive pain, swelling, redness or odor of or around the surgical area  · Temperature over 100.5  · Nausea and vomiting lasting longer than 4 hours or if unable to take medications  · Any signs of decreased circulation or nerve impairment to extremity: change in color, persistent  numbness, tingling, coldness or increase pain  · Any questions    What to do at Home:  Recommended activity: Activity as tolerated and no driving for today      These are general instructions for a healthy lifestyle:    No smoking/ No tobacco products/ Avoid exposure to second hand smoke  Surgeon General's Warning:  Quitting smoking now greatly reduces serious risk to your health.     Obesity, smoking, and sedentary lifestyle greatly increases your risk for illness    A healthy diet, regular physical exercise & weight monitoring are important for maintaining a healthy lifestyle    You may be retaining fluid if you have a history of heart failure or if you experience any of the following symptoms:  Weight gain of 3 pounds or more overnight or 5 pounds in a week, increased swelling in our hands or feet or shortness of breath while lying flat in bed. Please call your doctor as soon as you notice any of these symptoms; do not wait until your next office visit. Recognize signs and symptoms of STROKE:    F-face looks uneven    A-arms unable to move or move unevenly    S-speech slurred or non-existent    T-time-call 911 as soon as signs and symptoms begin-DO NOT go       Back to bed or wait to see if you get better-TIME IS BRAIN. Warning Signs of HEART ATTACK     Call 911 if you have these symptoms:   Chest discomfort. Most heart attacks involve discomfort in the center of the chest that lasts more than a few minutes, or that goes away and comes back. It can feel like uncomfortable pressure, squeezing, fullness, or pain.  Discomfort in other areas of the upper body. Symptoms can include pain or discomfort in one or both arms, the back, neck, jaw, or stomach.  Shortness of breath with or without chest discomfort.  Other signs may include breaking out in a cold sweat, nausea, or lightheadedness. Don't wait more than five minutes to call 911 - MINUTES MATTER! Fast action can save your life. Calling 911 is almost always the fastest way to get lifesaving treatment. Emergency Medical Services staff can begin treatment when they arrive -- up to an hour sooner than if someone gets to the hospital by car. The discharge information has been reviewed with the patient. The patient verbalized understanding. Discharge medications reviewed with the patient and appropriate educational materials and side effects teaching were provided. Patient armband removed and given to patient to take home.   Patient was informed of the privacy risks if armband lost or ko.

## 2018-09-12 NOTE — ANESTHESIA POSTPROCEDURE EVALUATION
Post-Anesthesia Evaluation and Assessment Patient: Yo Kan MRN: [id number]  SSN: [id number] YOB: 1945  Age: 72 y.o. Sex: female Cardiovascular Function/Vital Signs Visit Vitals  /57  Pulse 79  Resp 18  Ht 5' 3\" (1.6 m)  Wt 103.1 kg (227 lb 3 oz)  SpO2 95%  Breastfeeding No  
 BMI 40.24 kg/m2 Patient is status post MAC anesthesia for Procedure(s): 
COLONOSCOPY. Nausea/Vomiting: None Postoperative hydration reviewed and adequate. Pain: 
Pain Scale 1: Numeric (0 - 10) (09/12/18 1505) Pain Intensity 1: 0 (09/12/18 1505) Managed Neurological Status: At baseline Mental Status and Level of Consciousness: Alert and oriented Pulmonary Status:  
O2 Device: Room air (09/12/18 1507) Adequate oxygenation and airway patent Complications related to anesthesia: None Post-anesthesia assessment completed. No concerns Signed By: Ashley Desai CRNA September 12, 2018

## 2018-09-12 NOTE — H&P
Reason for Appointment   1. Rectal bleed     History of Present Illness   General:   Alain Lanes is a 81year old female patient of Dr Sherri Mortensen with a history of GERD controlled with Omeprazole 20mg daily, personal history of colonic polyps and family history of colon cancer affecting her mother. Last colonoscopy was in  in which a HP polyp was removed and internal hemorrhoids and diverticula in the left and sigmoid colon were noted. She comes in today reporting painless bright red blood with stools x 1 week. She denies straining, pushing, change in bowel habits, weight loss, NSAID use, abdominal or rectal pain. She usually takes ASA daily but has not in 2 weeks due to recent carpel tunnel surgery. Current Medications   Taking    Benicar 40 mg tablet 1 tab(s) orally once a day    hydrochlorothiazide 25 mg tablet 1 tab(s) orally once a day    Nifedical XL 30 mg tablet, extended release 1 tab(s) orally PRN    Fish Oil 1000 mg capsule 1 cap(s) orally 3 times a day    multivitamin Multiple Vitamins tablet 1 tab(s) orally once a day    Folic Acid tab 1 tab po qd    Omeprazole 20 mg delayed release capsule 1 cap(s) orally once a day--30 mins before breakfast    tamoxifen 10 mg tablet 1 tab(s) orally 2 times a day    VESIcare 10 mg tablet 1 tab(s) orally once a day    Vitamin B12 500 mcg tablet 1 tab(s) orally once a day    Not-Taking/PRN    Aspirin Low Dose 81 mg delayed release tablet 1 tab(s) orally once a day    Medication List reviewed and reconciled with the patient      Past Medical History   Adenomatous colon polyp. Hyperplastic colon polyp. Family hx of colon cancer. Hypertension. Hyperlipidemia. Mild heart murmur. Arthritis. Right bundle branch block.        Surgical History   Colonoscopy ,,,   Hysterectomy with BSO for prolapse    EGD/Colonoscopy    Carpal tunnel release      Family History   Father: , Testicular carcinoma, leukemia, diagnosed with Cancer   Mother:  68 yrs, colorectal carcinoma diagnosed at age 79, diagnosed with Cancer   Brother(s): 1 brother alive and well   Children: 4 children alive and well   Maternal Grand Mother: carcinoma of the cervix   No first degree family history of other GI cancers or inflammatory bowel disease. Social History   Marital Status: . Occupation: RN at [employer], labor and delivery, Retired. Smoking   Tobacco use: never smoker  Patient uses other tobacco products: No  e-Cigarettes No  no Alcohol. no IV Drug use. no Drugs (Illicit). no Blood transfusions. Allergies   penicillin: hives   codeine: eyes swelling   Injectable Cortizone: heart racing     Review of Systems   Complete review of systems taken. Positives per HPI. Negatives will not be listed here. Vital Signs   BMI 38.79, HR 87, /62, Wt 226, Ht 64\", RR 16.     Examination   General examination:  General appearance: well-developed, well-nourished, in no acute distress. HEENT: Head normocephalic, Sclera anicteric. Neck: Supple without masses or adenopathy. Chest: No rales, wheezes or rhonchi. Heart: Normal S1 S2, no murmurs. Back: No CVA or spinal tenderness, No sacral edema. Abdomen: Nontender, No guarding or rigidity, No hepatosplenomegaly, No masses, No ascites, No hernias present. Rectal: Sphincter within normal limits, No mass palpated, Stool is brown. Assessments     1. Bright red rectal bleeding - K62.5 (Primary), Painless Hematochezia, Ddx: hemorrhoids, colitis, diverticular, IBD, polyp, neoplasm, etc     2. Personal history of colonic polyps - Z86.010, TA and TVA removed in ; TA/HP removed in , , . HP removed in .     3. Family history of colon cancer in mother - Z80.0     3. Internal hemorrhoid - K64.8     5. Diverticular disease of colon - K57.30     Treatment   1.  Bright red rectal bleeding   Start PEG - 3350 * with electrolytes powder for reconsitution, 4000 ml, as directed, orally, as directed, 2 days, 1 gallon bottle  Start Reglan for prep tab, 10 mg, 1 tab, orally, 1 hr before prep, 2 doses, 2, Refills 0  IMAGING: Colonoscopy with MAC (Monitored Anesthesia Care) (Ordered for 09/12/2018)      Rozpb Filippo 9/4/2018 9:13:29 AM > [facility]      Notes: Consent for procedure obtained. 2. Others   Notes: Patient presented with new problem bright red rectal bleeding. Spoke with supervising physician Dr. Samantha Escobar who agrees with and indicates new plan of treatment is as stated in treatment plan. Patient seen and examined by ROSEANNA Munson and supervising/attending MD is Dr Samantha Escobar. Copy to PCP.           No significant interval change in history or physical findings since last office viist

## 2019-02-01 ENCOUNTER — OFFICE VISIT (OUTPATIENT)
Dept: SURGERY | Age: 74
End: 2019-02-01

## 2019-02-01 VITALS
DIASTOLIC BLOOD PRESSURE: 94 MMHG | HEIGHT: 63 IN | BODY MASS INDEX: 40.86 KG/M2 | SYSTOLIC BLOOD PRESSURE: 159 MMHG | WEIGHT: 230.6 LBS | HEART RATE: 77 BPM | TEMPERATURE: 96.6 F

## 2019-02-01 DIAGNOSIS — N60.91 ATYPICAL LOBULAR HYPERPLASIA OF RIGHT BREAST: ICD-10-CM

## 2019-02-01 RX ORDER — TAMOXIFEN CITRATE 20 MG/1
20 TABLET ORAL DAILY
Qty: 90 TAB | Refills: 3 | Status: SHIPPED | OUTPATIENT
Start: 2019-02-01 | End: 2021-11-10

## 2019-02-01 NOTE — PROGRESS NOTES
Chief Complaint   Patient presents with    Annual Exam     Annual breast exam s/p bilateral reduction 10.2016. Mammo 2.14.18. Denies concerns. 1. Have you been to the ER, urgent care clinic since your last visit? Hospitalized since your last visit?stepped on nail and had bronchitis 2018. Urgent care. 2. Have you seen or consulted any other health care providers outside of the [facility] since your last visit? Include any pap smears or colon screening.  No

## 2019-02-01 NOTE — PROGRESS NOTES
Ms. Nikki Parikh is a 72year old woman with a history of ADH and ALH found incidentally at time of bilateral reduction mammoplasty by Dr. Santi Edmond 10/19/16. She was started on tamoxifen by Dr. Jyoti Elizabeth. She is overall doing well. She is without complaints related to her breasts. Breast/GYN history:    OB History      Para Term  AB Living    3 3       4    SAB TAB Ectopic Molar Multiple Live Births            1             Past Medical History:   Diagnosis Date    Adverse effect of anesthesia     BLOOD PRESSURE DROPS    Adverse effect of anesthesia     5/10/18-pt states \"last 2 surgeries my blood pressure bottomed out\"    Atypical ductal hyperplasia of left breast     Atypical lobular hyperplasia of right breast     Cardiac arrhythmia     Colon polyps     Esophageal reflux     Exercise tolerance finding 05/10/2018     2 flights of stairs-no chest pain and no SOB     GERD (gastroesophageal reflux disease)     Heart murmur     Hypertension     Hypertension     Nausea & vomiting     Nocturia     Osteopenia     H/O--LAST TEST RESULTS WNL    Right bundle branch block     Stress incontinence        Past Surgical History:   Procedure Laterality Date    COLONOSCOPY N/A 2018    [facility] at [facility] ENDOSCOPY    [facility]      left    HX BLADDER SUSPENSION      HX BREAST RECONSTRUCTION Bilateral 2016    HX BREAST REDUCTION Bilateral 10/19/2016    bilateral inferior pedicle BREAST REDUCTION performed by Cuate Rosas MD at [facility] MAIN OR    HX COLONOSCOPY      HX GI      EGD AND COLONSCOPY    HX HYSTERECTOMY      HX KNEE ARTHROSCOPY      Right    HX OOPHORECTOMY      US GUIDED CORE BREAST BIOPSY      right  benign       Current Outpatient Medications on File Prior to Visit   Medication Sig Dispense Refill    cholecalciferol, VITAMIN D3, (VITAMIN D3) 5,000 unit tab tablet Take  by mouth daily.       cyanocobalamin 1,000 mcg tablet Take 2,500 mcg by mouth daily.  vit C/vit E/lutein/min/omega-3 (OCUVITE PO) Take  by mouth.  omeprazole (PRILOSEC) 20 mg capsule       solifenacin (VESICARE) 10 mg tablet Take 1 Tab by mouth daily. (Patient taking differently: Take 20 mg by mouth daily.) 90 Tab 3    aspirin delayed-release 81 mg tablet Take  by mouth daily.  hydrochlorothiazide (HYDRODIURIL) 50 mg tablet Take 50 mg by mouth daily.  olmesartan (BENICAR) 40 mg tablet Take 40 mg by mouth nightly.  omega-3 fatty acids-vitamin e (FISH OIL) 1,000 mg cap Take 1 Cap by mouth daily.  multivitamin (ONE A DAY) tablet Take 1 Tab by mouth daily.  calcium 500 mg tab Take 500 mg by mouth two (2) times a day. No current facility-administered medications on file prior to visit.         Allergies   Allergen Reactions    Codeine Swelling    Cortisone Palpitations     Injections    Pcn [Penicillins] Hives       Social History     Tobacco Use    Smoking status: Never Smoker    Smokeless tobacco: Never Used   Substance Use Topics    Alcohol use: No     Alcohol/week: 0.0 oz    Drug use: No       Family History   Problem Relation Age of Onset    Colon Cancer Mother     Hypertension Mother     Osteoporosis Mother     Parkinson's Disease Mother     Cancer Father         BONE AND TESTICULAR     Hypertension Father     Hypertension Brother     Cancer Maternal Grandmother     Hypertension Maternal Grandmother     Diabetes Maternal Grandfather     Heart Disease Maternal Grandfather          ROS: positives are bolded  General: fevers, chills, night sweats, fatigue, weight loss, weight gain  GI: nausea, vomiting, abdominal pain, change in bowel habits, hematochezia, melena, GERD  Integ: dermatitis, abnormal moles  HEENT: visual changes, vertigo, epistaxis, dysphagia, hoarseness  Cardiac: chest pain, palpitations, HTN, edema, varicosities  Resp: cough, shortness of breath, wheezing, hemoptysis, snoring, reactive airway disease  : hematuria, dysuria, frequency, urgency, nocturia, stress urinary incontinence   MSK: weakness, joint pain, arthritis  Endocrine:  thyroid disease, polyuria, polydipsia, polyphagia, poor wound healing, heat intolerance, cold intolerance  Lymph/Heme: anemia, bruising, history of blood transfusions  Neuro: dizziness, headache, fainting, seizures, stroke  Psych: anxiety, depression    Physical Exam:  Visit Vitals  BP (!) 159/94 (BP 1 Location: Right arm, BP Patient Position: Sitting)   Pulse 77   Temp 96.6 °F (35.9 °C) (Oral)   Ht 5' 3\" (1.6 m)   Wt 104.6 kg (230 lb 9.6 oz)   BMI 40.85 kg/m²       Gen:  No distress  Head: normocephalic, atraumatic  Mouth: Clear, no overt lesions, oral mucosa pink and moist  Neck: supple, no masses, no adenopathy, trachea midline  Resp: clear bilaterally  Cardio: Regular rate and rhythm  Abdomen: soft, nontender, nondistended  Extremeties: warm, well-perfused  Neuro: sensation and strength grossly intact and symmetrical  Psych: alert and oriented to person, place and time  Breasts:   Right: Examined in both the supine and upright positions. There was no supraclavicular, infraclavicular, or axillary lympadenopathy. There were no dominant masses, no skin changes, no asymmetry identified well healed surgical scars bilaterally  Left: Examined in both the supine and upright positions. There was no supraclavicular, infraclavicular, or axillary lympadenopathy. There were no dominant masses, no skin changes, no asymmetry identified  well healed surgical scars bilaterally      Imagin18 bilateral mammogram  No mammographic evidence of malignancy.  Recommend annual screening mammography  and clinical breast examination.     A result letter will be sent to the patient.     The patient will be notified by the [employer] reminder system for scheduling  of her annual screening mammogram.     BI-RADS Assessment Category 2: Benign finding / Letter 36 NM    Pathology:  10/19/16     A: BREAST, LEFT, PARTIAL RESECTION:   1139 GRAMS OF SKIN AND BREAST TISSUE WITH ATYPICAL DUCTAL HYPERPLASIA. INTRADUCTAL PAPILLOMA. MICROCALCIFICATIONS ARE IDENTIFIED. B: BREAST, RIGHT, PARTIAL RESECTION:   1629 GRAMS OF SKIN AND BREAST TISSUE WITH ATYPICAL LOBULAR HYPERPLASIA. INTRADUCTAL PAPILLOMA. MICROCALCIFICATIONS ARE IDENTIFIED. 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 Soraya Pastrana 10/19/16. She was started on tamoxifen by Dr. Josie Subramanian. We discussed the results of atypical ductal hyperplasia (ADH) and atypical lobular hyperplasia in detail. We discussed that generally they are not considered cancer or pre-cancer, but rather a high risk marker. A woman with ADH/ALH may have a higher than average risk of developing breast cancer. I have recommended she see medical oncology for future Tamoxifen prescriptions. She is due for bilateral mammogram this month. Assuming no abnormalities, I will see her back for exam next February 2020. All questions were answered. She was asked to call with any additional questions or concerns.

## 2019-02-01 NOTE — PATIENT INSTRUCTIONS
If you have any questions or concerns about today's appointment, the verbal and/or written instructions you were given for follow up care, please call our office at [phone].     [employer] [facility] - [address]    [phone] office  [phone]

## 2019-02-01 NOTE — LETTER
2/1/2019 8:37 AM 
 
Patient: Barbra Caballero YOB: 1945 Date of Visit: 2/1/2019 Jumana Ellis MD 
[address] [place] [address] [address] [facility] [address] [id number] VIA Facsimile: [phone] Dear Jumana Ellis MD, 
 
 
I had the pleasure of seeing Ms. Tucker Ross in my office today for follow up of her atypical ductal and lobular hyperplasia in transition from Dr. Baljeet Rodriguez. I am including a copy of my office visit today. If you have questions, please do not hesitate to call me. I look forward to following Ms. Pascale Mendoza along with you and will keep you updated as to her progress. Sincerely, Miguel Barron MD

## 2019-04-08 ENCOUNTER — HOSPITAL ENCOUNTER (OUTPATIENT)
Dept: MAMMOGRAPHY | Age: 74
Discharge: HOME | End: 2019-04-08
Attending: SURGERY
Payer: MEDICARE

## 2019-04-08 DIAGNOSIS — Z12.31 VISIT FOR SCREENING MAMMOGRAM: ICD-10-CM

## 2019-04-08 PROCEDURE — 77063 BREAST TOMOSYNTHESIS BI: CPT

## 2019-09-24 PROBLEM — G56.03 BILATERAL CARPAL TUNNEL SYNDROME: Status: ACTIVE | Noted: 2018-01-19

## 2019-11-05 ENCOUNTER — HOSPITAL ENCOUNTER (OUTPATIENT)
Dept: MRI IMAGING | Age: 74
Discharge: HOME | End: 2019-11-05
Attending: INTERNAL MEDICINE
Payer: MEDICARE

## 2019-11-05 VITALS — BODY MASS INDEX: 39.86 KG/M2 | WEIGHT: 225 LBS

## 2019-11-05 DIAGNOSIS — N60.82 SEBACEOUS CYST OF SKIN OF BREAST, LEFT: ICD-10-CM

## 2019-11-05 LAB — CREAT UR-MCNC: 0.9 MG/DL (ref 0.6–1.3)

## 2019-11-05 PROCEDURE — 82565 ASSAY OF CREATININE: CPT

## 2019-11-05 PROCEDURE — A9575 INJ GADOTERATE MEGLUMI 0.1ML: HCPCS | Performed by: INTERNAL MEDICINE

## 2019-11-05 PROCEDURE — 77049 MRI BREAST C-+ W/CAD BI: CPT

## 2019-11-05 PROCEDURE — 74011250636 HC RX REV CODE- 250/636: Performed by: INTERNAL MEDICINE

## 2019-11-05 RX ORDER — GADOTERATE MEGLUMINE 376.9 MG/ML
20 INJECTION INTRAVENOUS
Status: COMPLETED | OUTPATIENT
Start: 2019-11-05 | End: 2019-11-05

## 2019-11-05 RX ADMIN — GADOTERATE MEGLUMINE 20 ML: 376.9 INJECTION INTRAVENOUS at 09:35

## 2019-12-10 ENCOUNTER — HOSPITAL ENCOUNTER (OUTPATIENT)
Dept: MAMMOGRAPHY | Age: 74
Discharge: HOME | End: 2019-12-10
Attending: INTERNAL MEDICINE
Payer: MEDICARE

## 2019-12-10 ENCOUNTER — HOSPITAL ENCOUNTER (OUTPATIENT)
Dept: ULTRASOUND IMAGING | Age: 74
Discharge: HOME | End: 2019-12-10
Attending: INTERNAL MEDICINE
Payer: MEDICARE

## 2019-12-10 DIAGNOSIS — R92.8 ABNORMAL FINDING ON BREAST IMAGING: ICD-10-CM

## 2019-12-10 PROCEDURE — 76642 ULTRASOUND BREAST LIMITED: CPT

## 2019-12-10 PROCEDURE — 77061 BREAST TOMOSYNTHESIS UNI: CPT

## 2020-06-12 ENCOUNTER — HOSPITAL ENCOUNTER (OUTPATIENT)
Dept: MAMMOGRAPHY | Age: 75
Discharge: HOME | End: 2020-06-12
Attending: INTERNAL MEDICINE
Payer: MEDICARE

## 2020-06-12 DIAGNOSIS — Z12.31 VISIT FOR SCREENING MAMMOGRAM: ICD-10-CM

## 2020-06-12 PROCEDURE — 77063 BREAST TOMOSYNTHESIS BI: CPT

## 2020-12-14 ENCOUNTER — TRANSCRIBE ORDER (OUTPATIENT)
Dept: SCHEDULING | Age: 75
End: 2020-12-14

## 2020-12-14 DIAGNOSIS — N60.82 SEBACEOUS CYST OF SKIN OF BREAST, LEFT: Primary | ICD-10-CM

## 2020-12-21 ENCOUNTER — HOSPITAL ENCOUNTER (OUTPATIENT)
Dept: MRI IMAGING | Age: 75
Discharge: HOME | End: 2020-12-21
Attending: INTERNAL MEDICINE
Payer: MEDICARE

## 2020-12-21 VITALS — WEIGHT: 225 LBS | BODY MASS INDEX: 39.86 KG/M2

## 2020-12-21 DIAGNOSIS — N60.82 SEBACEOUS CYST OF SKIN OF BREAST, LEFT: ICD-10-CM

## 2020-12-21 PROCEDURE — 77049 MRI BREAST C-+ W/CAD BI: CPT

## 2020-12-21 PROCEDURE — 74011250636 HC RX REV CODE- 250/636: Performed by: INTERNAL MEDICINE

## 2020-12-21 PROCEDURE — A9575 INJ GADOTERATE MEGLUMI 0.1ML: HCPCS | Performed by: INTERNAL MEDICINE

## 2020-12-21 RX ORDER — GADOTERATE MEGLUMINE 376.9 MG/ML
20 INJECTION INTRAVENOUS
Status: COMPLETED | OUTPATIENT
Start: 2020-12-21 | End: 2020-12-21

## 2020-12-21 RX ADMIN — GADOTERATE MEGLUMINE 20 ML: 376.9 INJECTION INTRAVENOUS at 11:41

## 2021-06-17 ENCOUNTER — HOSPITAL ENCOUNTER (OUTPATIENT)
Dept: WOMENS IMAGING | Age: 76
Discharge: HOME | End: 2021-06-17
Attending: INTERNAL MEDICINE
Payer: MEDICARE

## 2021-06-17 DIAGNOSIS — Z12.31 ENCOUNTER FOR SCREENING MAMMOGRAM FOR BREAST CANCER: ICD-10-CM

## 2021-06-17 PROCEDURE — 77063 BREAST TOMOSYNTHESIS BI: CPT

## 2021-11-01 ENCOUNTER — HOSPITAL ENCOUNTER (OUTPATIENT)
Dept: PREADMISSION TESTING | Age: 76
Discharge: HOME | End: 2021-11-01
Payer: MEDICARE

## 2021-11-01 ENCOUNTER — TRANSCRIBE ORDER (OUTPATIENT)
Dept: REGISTRATION | Age: 76
End: 2021-11-01

## 2021-11-01 DIAGNOSIS — M17.11 OSTEOARTHRITIS OF RIGHT KNEE: ICD-10-CM

## 2021-11-01 DIAGNOSIS — M17.11 OSTEOARTHRITIS OF RIGHT KNEE: Primary | ICD-10-CM

## 2021-11-01 LAB
ALBUMIN SERPL-MCNC: 3.3 G/DL (ref 3.4–5)
ALBUMIN/GLOB SERPL: 0.8 {RATIO} (ref 0.8–1.7)
ALP SERPL-CCNC: 58 U/L (ref 45–117)
ALT SERPL-CCNC: 46 U/L (ref 13–56)
ANION GAP SERPL CALC-SCNC: 6 MMOL/L (ref 3–18)
APPEARANCE UR: CLEAR
APTT PPP: 26.3 SEC (ref 23–36.4)
AST SERPL-CCNC: 38 U/L (ref 10–38)
ATRIAL RATE: 80 BPM
BACTERIA URNS QL MICRO: ABNORMAL /HPF
BASOPHILS # BLD: 0.1 K/UL (ref 0–0.1)
BASOPHILS NFR BLD: 1 % (ref 0–2)
BILIRUB SERPL-MCNC: 0.7 MG/DL (ref 0.2–1)
BILIRUB UR QL: NEGATIVE
BUN SERPL-MCNC: 17 MG/DL (ref 7–18)
BUN/CREAT SERPL: 18 (ref 12–20)
CALCIUM SERPL-MCNC: 9.4 MG/DL (ref 8.5–10.1)
CALCULATED P AXIS, ECG09: 38 DEGREES
CALCULATED R AXIS, ECG10: -68 DEGREES
CALCULATED T AXIS, ECG11: 48 DEGREES
CHLORIDE SERPL-SCNC: 102 MMOL/L (ref 100–111)
CO2 SERPL-SCNC: 30 MMOL/L (ref 21–32)
COLOR UR: ABNORMAL
CREAT SERPL-MCNC: 0.93 MG/DL (ref 0.6–1.3)
DIAGNOSIS, 93000: NORMAL
DIFFERENTIAL METHOD BLD: ABNORMAL
EOSINOPHIL # BLD: 0 K/UL (ref 0–0.4)
EOSINOPHIL NFR BLD: 0 % (ref 0–5)
EPITH CASTS URNS QL MICRO: ABNORMAL /LPF (ref 0–5)
ERYTHROCYTE [DISTWIDTH] IN BLOOD BY AUTOMATED COUNT: 13 % (ref 11.6–14.5)
ERYTHROCYTE [SEDIMENTATION RATE] IN BLOOD: 42 MM/HR (ref 0–30)
EST. AVERAGE GLUCOSE BLD GHB EST-MCNC: 123 MG/DL
GLOBULIN SER CALC-MCNC: 4.2 G/DL (ref 2–4)
GLUCOSE SERPL-MCNC: 121 MG/DL (ref 74–99)
GLUCOSE UR STRIP.AUTO-MCNC: NEGATIVE MG/DL
HBA1C MFR BLD: 5.9 % (ref 4.2–5.6)
HCT VFR BLD AUTO: 39.6 % (ref 35–45)
HGB BLD-MCNC: 13.4 G/DL (ref 12–16)
HGB UR QL STRIP: NEGATIVE
INR PPP: 1 (ref 0.8–1.2)
KETONES UR QL STRIP.AUTO: NEGATIVE MG/DL
LEUKOCYTE ESTERASE UR QL STRIP.AUTO: ABNORMAL
LYMPHOCYTES # BLD: 3.6 K/UL (ref 0.9–3.6)
LYMPHOCYTES NFR BLD: 47 % (ref 21–52)
MCH RBC QN AUTO: 30 PG (ref 24–34)
MCHC RBC AUTO-ENTMCNC: 33.8 G/DL (ref 31–37)
MCV RBC AUTO: 88.6 FL (ref 78–100)
MONOCYTES # BLD: 0.9 K/UL (ref 0.05–1.2)
MONOCYTES NFR BLD: 12 % (ref 3–10)
NEUTS SEG # BLD: 3 K/UL (ref 1.8–8)
NEUTS SEG NFR BLD: 39 % (ref 40–73)
NITRITE UR QL STRIP.AUTO: POSITIVE
P-R INTERVAL, ECG05: 148 MS
PH UR STRIP: 6 [PH] (ref 5–8)
PLATELET # BLD AUTO: 308 K/UL (ref 135–420)
PMV BLD AUTO: 10.1 FL (ref 9.2–11.8)
POTASSIUM SERPL-SCNC: 3.6 MMOL/L (ref 3.5–5.5)
PROT SERPL-MCNC: 7.5 G/DL (ref 6.4–8.2)
PROT UR STRIP-MCNC: NEGATIVE MG/DL
PROTHROMBIN TIME: 12.4 SEC (ref 11.5–15.2)
Q-T INTERVAL, ECG07: 420 MS
QRS DURATION, ECG06: 142 MS
QTC CALCULATION (BEZET), ECG08: 484 MS
RBC # BLD AUTO: 4.47 M/UL (ref 4.2–5.3)
RBC #/AREA URNS HPF: ABNORMAL /HPF (ref 0–5)
SODIUM SERPL-SCNC: 138 MMOL/L (ref 136–145)
SP GR UR REFRACTOMETRY: 1.02 (ref 1–1.03)
UROBILINOGEN UR QL STRIP.AUTO: 1 EU/DL (ref 0.2–1)
VENTRICULAR RATE, ECG03: 80 BPM
WBC # BLD AUTO: 7.6 K/UL (ref 4.6–13.2)
WBC URNS QL MICRO: ABNORMAL /HPF (ref 0–5)

## 2021-11-01 PROCEDURE — 87086 URINE CULTURE/COLONY COUNT: CPT

## 2021-11-01 PROCEDURE — 85025 COMPLETE CBC W/AUTO DIFF WBC: CPT

## 2021-11-01 PROCEDURE — 87186 SC STD MICRODIL/AGAR DIL: CPT

## 2021-11-01 PROCEDURE — 85610 PROTHROMBIN TIME: CPT

## 2021-11-01 PROCEDURE — 93005 ELECTROCARDIOGRAM TRACING: CPT

## 2021-11-01 PROCEDURE — 85652 RBC SED RATE AUTOMATED: CPT

## 2021-11-01 PROCEDURE — 80053 COMPREHEN METABOLIC PANEL: CPT

## 2021-11-01 PROCEDURE — 83036 HEMOGLOBIN GLYCOSYLATED A1C: CPT

## 2021-11-01 PROCEDURE — 81001 URINALYSIS AUTO W/SCOPE: CPT

## 2021-11-01 PROCEDURE — 36415 COLL VENOUS BLD VENIPUNCTURE: CPT

## 2021-11-01 PROCEDURE — 85730 THROMBOPLASTIN TIME PARTIAL: CPT

## 2021-11-01 PROCEDURE — 87077 CULTURE AEROBIC IDENTIFY: CPT

## 2021-11-03 LAB
BACTERIA SPEC CULT: NORMAL
BACTERIA SPEC CULT: NORMAL
SERVICE CMNT-IMP: NORMAL

## 2021-11-04 LAB
BACTERIA SPEC CULT: ABNORMAL
CC UR VC: ABNORMAL
SERVICE CMNT-IMP: ABNORMAL

## 2021-11-15 NOTE — NURSE NAVIGATOR
Chris Gillis watched the joint seminar online and received a preoperative education booklet in anticipation of joint replacement surgery.      Orthopaedic Nurse Navigator

## 2021-11-21 PROBLEM — M17.11 PRIMARY LOCALIZED OSTEOARTHRITIS OF RIGHT KNEE: Chronic | Status: ACTIVE | Noted: 2021-11-21

## 2021-11-21 RX ORDER — AMLODIPINE BESYLATE 10 MG/1
10 TABLET ORAL DAILY
Status: CANCELLED | OUTPATIENT
Start: 2021-11-21

## 2021-11-21 RX ORDER — LOSARTAN POTASSIUM 50 MG/1
100 TABLET ORAL DAILY
Status: CANCELLED | OUTPATIENT
Start: 2021-11-21

## 2021-11-21 RX ORDER — PROMETHAZINE HYDROCHLORIDE 25 MG/1
25 TABLET ORAL
Status: CANCELLED | OUTPATIENT
Start: 2021-11-21

## 2021-11-21 RX ORDER — DEXAMETHASONE SODIUM PHOSPHATE 4 MG/ML
8 INJECTION, SOLUTION INTRA-ARTICULAR; INTRALESIONAL; INTRAMUSCULAR; INTRAVENOUS; SOFT TISSUE ONCE
Status: CANCELLED | OUTPATIENT
Start: 2021-11-21 | End: 2021-11-21

## 2021-11-21 RX ORDER — HYDROCHLOROTHIAZIDE 25 MG/1
50 TABLET ORAL DAILY
Status: CANCELLED | OUTPATIENT
Start: 2021-11-21

## 2021-11-21 RX ORDER — PRAVASTATIN SODIUM 20 MG/1
20 TABLET ORAL DAILY
Status: CANCELLED | OUTPATIENT
Start: 2021-11-21

## 2021-11-21 NOTE — H&P
[facility]  History and Physical Exam    Patient: Dk Martinez MRN: [id number]  SSN: [id number]    YOB: 1945  Age: 78 y.o. Sex: female      Subjective:      Chief Complaint: Right knee pain    History of Present Illness:  Patient complains of pain to the right knee and difficulty ambulating, which has progressively worsened over several months. X-rays showed osteoarthritis of the joint. The patient's pain has persisted and progressed despite conservative treatments and therapies. The patient has been previously treated with nsaids. The patient has at this time opted for surgical intervention.        Past Medical History:   Diagnosis Date    Adverse effect of anesthesia     BLOOD PRESSURE DROPS    Adverse effect of anesthesia     5/10/18-pt states \"last 2 surgeries my blood pressure bottomed out\"    Atypical ductal hyperplasia of left breast     Atypical lobular hyperplasia of right breast     Cardiac arrhythmia     Colon polyps     Esophageal reflux     Exercise tolerance finding 05/10/2018     2 flights of stairs-no chest pain and no SOB     GERD (gastroesophageal reflux disease)     Heart murmur     Hypertension     Hypertension     Mixed incontinence urge and stress (male)(female)     Nausea & vomiting     Nocturia     OAB (overactive bladder)     Osteopenia     H/O--LAST TEST RESULTS WNL    Primary localized osteoarthritis of right knee 11/21/2021    Right bundle branch block     Stress incontinence      Past Surgical History:   Procedure Laterality Date    COLONOSCOPY N/A 9/12/2018    COLONOSCOPY performed by Simeon Parikh MD at [facility] ENDOSCOPY    HX [address] [place] [address]      left    HX BLADDER SUSPENSION      HX BREAST RECONSTRUCTION Bilateral 11/2016    HX BREAST REDUCTION Bilateral 10/19/2016    bilateral inferior pedicle BREAST REDUCTION performed by Ivelisse Holland MD at [facility] MAIN OR    HX COLONOSCOPY      HX GI EGD AND COLONSCOPY    HX HYSTERECTOMY      HX KNEE ARTHROSCOPY      Right    HX OOPHORECTOMY      US GUIDED CORE BREAST BIOPSY      right  benign     Social History     Occupational History    Not on file   Tobacco Use    Smoking status: Never Smoker    Smokeless tobacco: Never Used   Substance and Sexual Activity    Alcohol use: No     Alcohol/week: 0.0 standard drinks    Drug use: No    Sexual activity: Yes     Partners: Male     Prior to Admission medications    Medication Sig Start Date End Date Taking? Authorizing Provider   mirabegron ER (Myrbetriq) 50 mg ER tablet Take 1 Tablet by mouth daily. 11/15/21   Liliana Kramer NP   pravastatin (PRAVACHOL) 20 mg tablet  10/18/20   Provider, Historical   amLODIPine (NORVASC) 10 mg tablet  10/31/20   Provider, Historical   trospium (SANCTURA XL) 60 mg capsule Take 1 Cap by mouth Daily (before breakfast). 11/10/20   Liliana Kramer NP   promethazine (PHENERGAN) 25 mg tablet TK 1 T PO  Q 6 H 6/25/19   Provider, Historical   losartan (COZAAR) 100 mg tablet  6/22/19   Provider, Historical   methylcellulose (CITRUCEL PO) Take  by mouth. Provider, Historical   cholecalciferol, VITAMIN D3, (VITAMIN D3) 5,000 unit tab tablet Take  by mouth daily. Provider, Historical   cyanocobalamin 1,000 mcg tablet Take 2,500 mcg by mouth daily. Provider, Historical   vit C/vit E/lutein/min/omega-3 (OCUVITE PO) Take  by mouth. Provider, Historical   omeprazole (PRILOSEC) 20 mg capsule  5/6/18   Provider, Historical   aspirin delayed-release 81 mg tablet Take  by mouth daily. Provider, Historical   hydrochlorothiazide (HYDRODIURIL) 50 mg tablet Take 50 mg by mouth daily. Provider, Historical   olmesartan (BENICAR) 40 mg tablet Take 40 mg by mouth nightly. Provider, Historical   omega-3 fatty acids-vitamin e (FISH OIL) 1,000 mg cap Take 1 Cap by mouth daily. Provider, Historical   multivitamin (ONE A DAY) tablet Take 1 Tab by mouth daily.     Provider, Historical   calcium 500 mg tab Take 500 mg by mouth two (2) times a day. Provider, Historical       Allergies: Allergies   Allergen Reactions    Codeine Swelling    Cortisone Palpitations     Injections    Pcn [Penicillins] Hives        Review of Systems:  A comprehensive review of systems was negative except for that written in the History of Present Illness. Objective:       Physical Exam:  HEENT: Normocephalic, atraumatic  Lungs:  Clear to auscultation  Heart:   Regular rate and rhythm  Abdomen: Soft  Extremities:  Pain with range of motion of the right knee(s). Active extension decreased, active flexion decreased. Tenderness generalized. No deformity. No effusion. Positive crepitus. Antalgic gait. Assessment:      Arthritis of the right knee. Plan:       Proceed with scheduled RIGHT TOTAL KNEE ARTHROPLASTY. The various methods of treatment have been discussed with the patient and family. After consideration of risks, benefits, and other options for treatment, the patient has consented to surgical interventions. Questions were answered and preoperative teaching was done by Dr Lanette Moses.      Signed By: SYLVIA Flores     November 21, 2021

## 2021-11-21 NOTE — DISCHARGE INSTRUCTIONS
[address] [place] [address] Sports Medicine   Patient Discharge Instructions    Ela Martin / [id number] : 1945    Admitted (Not on file) Discharged: 2021     IF YOU HAVE ANY PROBLEMS ONCE YOU ARE  [facility]:   Main office number: [phone]    Your follow up appointment to see either Dr. Otis Murrell PA-C, or [facility] PAVALERIANO as scheduled in 2 weeks. If you are unsure of your appointment date call the office at [phone]. Medication Instructions     · Resume your home medictions as directed, you may have directed not to resume supplements until after your follow up. · A prescription for pain medication has been given   · It is important that you take the medication exactly as they are prescribed. · Keep your medication in the bottles provided by the pharmacist and keep a list of the medication names, dosages, and times to be taken in your wallet. · Do not take other medications without consulting your doctor. What to do at [address] [place] [address] Ave your prehospital diet. If you have excessive nausea or vomitting call your doctor's office. Be sure to maintain adequate fluid intake. Some pain medications may cause constipation. Remember to drink fluids, stay as active as possible, and eat plenty of fiber-rich foods. Begin In-Home Physical Therapy; 3 times a week to work on gait training, range of motion, strengthening, and weight bearing exercises as tolerable. Continue to use your walker or cane when walking. May progress from the walker to a cane to complete total bearing as tolerable. Patient may shower. Wrap incision with plastic wrap/covering to prevent incision from getting wet. Avoid complete immersion. YOUR DRESSING SHOULD BE CHANGED BY YOUR HOME HEALTH NURSE 5-7 AFTER SURGERY ACCORDING TO THE DATE WRITTEN ON YOUR DRESSING.       When to Call    - Call if you have a temperature greater then 101  - Unable to keep food down  - Are unable to bear any wieght   - Need a pain medication refill     Information obtained by :  I understand that if any problems occur once I am at home I am to contact my physician. I understand and acknowledge receipt of the instructions indicated above.                                                                                                                                            Physician's or R.N.'s Signature                                                                  Date/Time                                                                                                                                              Patient or Representative Signature                                                          Date/Time

## 2021-11-22 ENCOUNTER — HOSPITAL ENCOUNTER (OUTPATIENT)
Dept: PREADMISSION TESTING | Age: 76
Discharge: HOME | End: 2021-11-22

## 2021-11-22 VITALS — BODY MASS INDEX: 37.56 KG/M2 | WEIGHT: 220 LBS | HEIGHT: 64 IN

## 2021-11-22 RX ORDER — MIRABEGRON 50 MG/1
50 TABLET, FILM COATED, EXTENDED RELEASE ORAL
COMMUNITY
End: 2022-03-07

## 2021-11-22 RX ORDER — CHLORTHALIDONE 25 MG/1
25 TABLET ORAL DAILY
COMMUNITY

## 2021-11-22 RX ORDER — CEFAZOLIN SODIUM/WATER 2 G/20 ML
2 SYRINGE (ML) INTRAVENOUS ONCE
Status: CANCELLED | OUTPATIENT
Start: 2021-11-22 | End: 2021-11-22

## 2021-11-22 RX ORDER — TAMOXIFEN CITRATE 20 MG/1
20 TABLET ORAL DAILY
COMMUNITY
End: 2022-03-07

## 2021-11-22 RX ORDER — SODIUM CHLORIDE, SODIUM LACTATE, POTASSIUM CHLORIDE, CALCIUM CHLORIDE 600; 310; 30; 20 MG/100ML; MG/100ML; MG/100ML; MG/100ML
125 INJECTION, SOLUTION INTRAVENOUS CONTINUOUS
Status: CANCELLED | OUTPATIENT
Start: 2021-11-22

## 2021-11-22 NOTE — PERIOP NOTES
PAT - SURGICAL PRE-ADMISSION INSTRUCTIONS    NAME:  Genny Akbar                                                          TODAY'S DATE:  11/22/2021    SURGERY DATE:  11/29/2021                                  SURGERY ARRIVAL TIME:   TBA    1. Do NOT eat or drink anything, including candy or gum, after MIDNIGHT on 11-29 , unless you have specific instructions from your Surgeon or Anesthesia Provider to do so. 2. No smoking 24 hours before surgery. 3. No alcohol 24 hours prior to the day of surgery. 4. No recreational drugs for one week prior to the day of surgery. 5. Leave all valuables, including money/purse, at home. 6. Remove all jewelry, nail polish, makeup (including mascara); no lotions, powders, deodorant, or perfume/cologne/after shave. 7. Glasses/Contact lenses and Dentures may be worn to the hospital.  They will be removed prior to surgery. 8. Call your doctor if symptoms of a cold or illness develop within 24 ours prior to surgery. 9. AN ADULT MUST DRIVE YOU HOME AFTER OUTPATIENT SURGERY. 10. If you are having an OUTPATIENT procedure, please make arrangements for a responsible adult to be with you for 24 hours after your surgery. 11. If you are admitted to the hospital, you will be assigned to a bed after surgery is complete. Normally a family member will not be able to see you until you are in your assigned bed. 12. Visitation Restrictions Explained. Special Instructions:  Covid Test not needed, pt will fax copy of her covid vaccine and booster, states will call [facility] and have them fax it, pt's copy is on her phone, Bring CPAP.     Patient Prep:    use CHG solution     Pt has Bactrim rx to start 5 days prior to DOS as ordered by Dr. Dmitriy Mcginnis for UTI         These surgical instructions were reviewed with the patient during the PAT phone call

## 2021-11-23 NOTE — PERIOP NOTES
Anesthesia comments re: SOB on exertion faxed to [facility] office and [facility] notified.

## 2021-11-29 ENCOUNTER — ANESTHESIA EVENT (OUTPATIENT)
Dept: SURGERY | Age: 76
End: 2021-11-29
Payer: MEDICARE

## 2021-11-29 ENCOUNTER — HOSPITAL ENCOUNTER (OUTPATIENT)
Age: 76
Discharge: HOME HEALTH | End: 2021-11-29
Attending: ORTHOPAEDIC SURGERY | Admitting: ORTHOPAEDIC SURGERY
Payer: MEDICARE

## 2021-11-29 ENCOUNTER — APPOINTMENT (OUTPATIENT)
Dept: GENERAL RADIOLOGY | Age: 76
End: 2021-11-29
Attending: PHYSICIAN ASSISTANT
Payer: MEDICARE

## 2021-11-29 ENCOUNTER — HOME HEALTH ADMISSION (OUTPATIENT)
Dept: HOME HEALTH SERVICES | Facility: HOME HEALTH | Age: 76
End: 2021-11-29
Payer: MEDICARE

## 2021-11-29 ENCOUNTER — ANESTHESIA (OUTPATIENT)
Dept: SURGERY | Age: 76
End: 2021-11-29
Payer: MEDICARE

## 2021-11-29 VITALS
RESPIRATION RATE: 15 BRPM | DIASTOLIC BLOOD PRESSURE: 67 MMHG | SYSTOLIC BLOOD PRESSURE: 151 MMHG | WEIGHT: 225.5 LBS | HEART RATE: 70 BPM | OXYGEN SATURATION: 95 % | HEIGHT: 64 IN | BODY MASS INDEX: 38.5 KG/M2 | TEMPERATURE: 98.2 F

## 2021-11-29 DIAGNOSIS — M17.11 PRIMARY LOCALIZED OSTEOARTHRITIS OF RIGHT KNEE: Primary | Chronic | ICD-10-CM

## 2021-11-29 LAB
ABO + RH BLD: NORMAL
BLOOD GROUP ANTIBODIES SERPL: NORMAL
GLUCOSE BLD STRIP.AUTO-MCNC: 119 MG/DL (ref 70–110)
SPECIMEN EXP DATE BLD: NORMAL

## 2021-11-29 PROCEDURE — 77030031139 HC SUT VCRL2 J&J -A: Performed by: ORTHOPAEDIC SURGERY

## 2021-11-29 PROCEDURE — 77030027138 HC INCENT SPIROMETER -A: Performed by: ORTHOPAEDIC SURGERY

## 2021-11-29 PROCEDURE — 77030038010: Performed by: ORTHOPAEDIC SURGERY

## 2021-11-29 PROCEDURE — 74011000250 HC RX REV CODE- 250: Performed by: NURSE ANESTHETIST, CERTIFIED REGISTERED

## 2021-11-29 PROCEDURE — 77030002934 HC SUT MCRYL J&J -B: Performed by: ORTHOPAEDIC SURGERY

## 2021-11-29 PROCEDURE — 76060000033 HC ANESTHESIA 1 TO 1.5 HR: Performed by: ORTHOPAEDIC SURGERY

## 2021-11-29 PROCEDURE — 74011250636 HC RX REV CODE- 250/636: Performed by: PHYSICIAN ASSISTANT

## 2021-11-29 PROCEDURE — 76010000149 HC OR TIME 1 TO 1.5 HR: Performed by: ORTHOPAEDIC SURGERY

## 2021-11-29 PROCEDURE — 74011000272 HC RX REV CODE- 272: Performed by: ORTHOPAEDIC SURGERY

## 2021-11-29 PROCEDURE — 77030012508 HC MSK AIRWY LMA AMBU -A: Performed by: STUDENT IN AN ORGANIZED HEALTH CARE EDUCATION/TRAINING PROGRAM

## 2021-11-29 PROCEDURE — 97535 SELF CARE MNGMENT TRAINING: CPT

## 2021-11-29 PROCEDURE — 74011250636 HC RX REV CODE- 250/636: Performed by: ORTHOPAEDIC SURGERY

## 2021-11-29 PROCEDURE — 76210000006 HC OR PH I REC 0.5 TO 1 HR: Performed by: ORTHOPAEDIC SURGERY

## 2021-11-29 PROCEDURE — C1713 ANCHOR/SCREW BN/BN,TIS/BN: HCPCS | Performed by: ORTHOPAEDIC SURGERY

## 2021-11-29 PROCEDURE — 36415 COLL VENOUS BLD VENIPUNCTURE: CPT

## 2021-11-29 PROCEDURE — 74011250637 HC RX REV CODE- 250/637: Performed by: PHYSICIAN ASSISTANT

## 2021-11-29 PROCEDURE — 73560 X-RAY EXAM OF KNEE 1 OR 2: CPT

## 2021-11-29 PROCEDURE — 77030003666 HC NDL SPINAL BD -A: Performed by: ORTHOPAEDIC SURGERY

## 2021-11-29 PROCEDURE — 77030034694 HC SCPL CANADY PLSM DISP USMD -E: Performed by: ORTHOPAEDIC SURGERY

## 2021-11-29 PROCEDURE — 77030020782 HC GWN BAIR PAWS FLX 3M -B: Performed by: ORTHOPAEDIC SURGERY

## 2021-11-29 PROCEDURE — 86901 BLOOD TYPING SEROLOGIC RH(D): CPT

## 2021-11-29 PROCEDURE — 77030013708 HC HNDPC SUC IRR PULS STRY –B: Performed by: ORTHOPAEDIC SURGERY

## 2021-11-29 PROCEDURE — 74011250636 HC RX REV CODE- 250/636: Performed by: NURSE ANESTHETIST, CERTIFIED REGISTERED

## 2021-11-29 PROCEDURE — 82962 GLUCOSE BLOOD TEST: CPT

## 2021-11-29 PROCEDURE — 77030037713 HC CLOSR DEV INCIS ZIP STRY -B: Performed by: ORTHOPAEDIC SURGERY

## 2021-11-29 PROCEDURE — 77030033263 HC DRSG MEPILEX 16-48IN BORD MOLN -B: Performed by: ORTHOPAEDIC SURGERY

## 2021-11-29 PROCEDURE — 74011250637 HC RX REV CODE- 250/637: Performed by: STUDENT IN AN ORGANIZED HEALTH CARE EDUCATION/TRAINING PROGRAM

## 2021-11-29 PROCEDURE — 74011000258 HC RX REV CODE- 258: Performed by: ORTHOPAEDIC SURGERY

## 2021-11-29 PROCEDURE — C1776 JOINT DEVICE (IMPLANTABLE): HCPCS | Performed by: ORTHOPAEDIC SURGERY

## 2021-11-29 PROCEDURE — 64447 NJX AA&/STRD FEMORAL NRV IMG: CPT | Performed by: ORTHOPAEDIC SURGERY

## 2021-11-29 PROCEDURE — 97161 PT EVAL LOW COMPLEX 20 MIN: CPT

## 2021-11-29 PROCEDURE — 74011000250 HC RX REV CODE- 250: Performed by: ORTHOPAEDIC SURGERY

## 2021-11-29 PROCEDURE — 2709999900 HC NON-CHARGEABLE SUPPLY: Performed by: ORTHOPAEDIC SURGERY

## 2021-11-29 PROCEDURE — 74011250636 HC RX REV CODE- 250/636: Performed by: STUDENT IN AN ORGANIZED HEALTH CARE EDUCATION/TRAINING PROGRAM

## 2021-11-29 PROCEDURE — 97116 GAIT TRAINING THERAPY: CPT

## 2021-11-29 PROCEDURE — 77030011628: Performed by: ORTHOPAEDIC SURGERY

## 2021-11-29 PROCEDURE — 77030039760: Performed by: ORTHOPAEDIC SURGERY

## 2021-11-29 PROCEDURE — 74011000250 HC RX REV CODE- 250: Performed by: STUDENT IN AN ORGANIZED HEALTH CARE EDUCATION/TRAINING PROGRAM

## 2021-11-29 PROCEDURE — 77030012893: Performed by: ORTHOPAEDIC SURGERY

## 2021-11-29 PROCEDURE — 97165 OT EVAL LOW COMPLEX 30 MIN: CPT

## 2021-11-29 PROCEDURE — 76210000022 HC REC RM PH II 1.5 TO 2 HR

## 2021-11-29 PROCEDURE — 76942 ECHO GUIDE FOR BIOPSY: CPT | Performed by: ORTHOPAEDIC SURGERY

## 2021-11-29 DEVICE — RT SIZE 4 FEMORAL CR NONPOROUS
Type: IMPLANTABLE DEVICE | Site: KNEE | Status: FUNCTIONAL
Brand: BKS TRIMAX

## 2021-11-29 DEVICE — CEMENT BNE 40GM FULL DOSE PMMA W/O ANTIBIO HI VISC N RADPQ: Type: IMPLANTABLE DEVICE | Site: KNEE | Status: FUNCTIONAL

## 2021-11-29 DEVICE — SIZE 3 TIBIAL TRAY NONPOROUS
Type: IMPLANTABLE DEVICE | Site: KNEE | Status: FUNCTIONAL
Brand: BALANCED KNEE SYSTEM

## 2021-11-29 DEVICE — KNEE K1 TOT HEMI STD CEM IMPL CAPPED K1 OD: Type: IMPLANTABLE DEVICE | Site: KNEE | Status: FUNCTIONAL

## 2021-11-29 DEVICE — SIZE 3 10MM TIBIAL INSERT UC
Type: IMPLANTABLE DEVICE | Site: KNEE | Status: FUNCTIONAL
Brand: BKS E-VITALIZE

## 2021-11-29 DEVICE — 29MM PATELLA, VITAMIN E
Type: IMPLANTABLE DEVICE | Site: KNEE | Status: FUNCTIONAL
Brand: BKS E-VITALIZE

## 2021-11-29 RX ORDER — NALOXONE HYDROCHLORIDE 0.4 MG/ML
0.04 INJECTION, SOLUTION INTRAMUSCULAR; INTRAVENOUS; SUBCUTANEOUS
Status: DISCONTINUED | OUTPATIENT
Start: 2021-11-29 | End: 2021-11-29 | Stop reason: HOSPADM

## 2021-11-29 RX ORDER — HYDROMORPHONE HYDROCHLORIDE 2 MG/ML
INJECTION, SOLUTION INTRAMUSCULAR; INTRAVENOUS; SUBCUTANEOUS AS NEEDED
Status: DISCONTINUED | OUTPATIENT
Start: 2021-11-29 | End: 2021-11-29 | Stop reason: HOSPADM

## 2021-11-29 RX ORDER — TRANEXAMIC ACID 650 1/1
1950 TABLET ORAL ONCE
Status: COMPLETED | OUTPATIENT
Start: 2021-11-29 | End: 2021-11-29

## 2021-11-29 RX ORDER — ONDANSETRON 2 MG/ML
INJECTION INTRAMUSCULAR; INTRAVENOUS AS NEEDED
Status: DISCONTINUED | OUTPATIENT
Start: 2021-11-29 | End: 2021-11-29 | Stop reason: HOSPADM

## 2021-11-29 RX ORDER — KETAMINE HYDROCHLORIDE 10 MG/ML
INJECTION, SOLUTION INTRAMUSCULAR; INTRAVENOUS AS NEEDED
Status: DISCONTINUED | OUTPATIENT
Start: 2021-11-29 | End: 2021-11-29 | Stop reason: HOSPADM

## 2021-11-29 RX ORDER — NALBUPHINE HYDROCHLORIDE 10 MG/ML
5 INJECTION, SOLUTION INTRAMUSCULAR; INTRAVENOUS; SUBCUTANEOUS
Status: DISCONTINUED | OUTPATIENT
Start: 2021-11-29 | End: 2021-11-29 | Stop reason: HOSPADM

## 2021-11-29 RX ORDER — SODIUM CHLORIDE, SODIUM LACTATE, POTASSIUM CHLORIDE, CALCIUM CHLORIDE 600; 310; 30; 20 MG/100ML; MG/100ML; MG/100ML; MG/100ML
50 INJECTION, SOLUTION INTRAVENOUS CONTINUOUS
Status: DISCONTINUED | OUTPATIENT
Start: 2021-11-29 | End: 2021-11-29 | Stop reason: HOSPADM

## 2021-11-29 RX ORDER — LIDOCAINE HYDROCHLORIDE 20 MG/ML
INJECTION, SOLUTION EPIDURAL; INFILTRATION; INTRACAUDAL; PERINEURAL AS NEEDED
Status: DISCONTINUED | OUTPATIENT
Start: 2021-11-29 | End: 2021-11-29 | Stop reason: HOSPADM

## 2021-11-29 RX ORDER — CEFAZOLIN SODIUM/WATER 2 G/20 ML
2 SYRINGE (ML) INTRAVENOUS ONCE
Status: COMPLETED | OUTPATIENT
Start: 2021-11-29 | End: 2021-11-29

## 2021-11-29 RX ORDER — DEXMEDETOMIDINE HYDROCHLORIDE 100 UG/ML
INJECTION, SOLUTION INTRAVENOUS
Status: SHIPPED | OUTPATIENT
Start: 2021-11-29 | End: 2021-11-29

## 2021-11-29 RX ORDER — CEFAZOLIN SODIUM/WATER 2 G/20 ML
2 SYRINGE (ML) INTRAVENOUS EVERY 8 HOURS
Status: DISCONTINUED | OUTPATIENT
Start: 2021-11-29 | End: 2021-11-29 | Stop reason: HOSPADM

## 2021-11-29 RX ORDER — OXYCODONE HYDROCHLORIDE 5 MG/1
10 TABLET ORAL
Status: DISCONTINUED | OUTPATIENT
Start: 2021-11-29 | End: 2021-11-29 | Stop reason: HOSPADM

## 2021-11-29 RX ORDER — ROPIVACAINE HYDROCHLORIDE 5 MG/ML
INJECTION, SOLUTION EPIDURAL; INFILTRATION; PERINEURAL
Status: COMPLETED | OUTPATIENT
Start: 2021-11-29 | End: 2021-11-29

## 2021-11-29 RX ORDER — DIPHENHYDRAMINE HYDROCHLORIDE 50 MG/ML
12.5 INJECTION, SOLUTION INTRAMUSCULAR; INTRAVENOUS
Status: DISCONTINUED | OUTPATIENT
Start: 2021-11-29 | End: 2021-11-29 | Stop reason: HOSPADM

## 2021-11-29 RX ORDER — SODIUM CHLORIDE 0.9 % (FLUSH) 0.9 %
5-40 SYRINGE (ML) INJECTION AS NEEDED
Status: DISCONTINUED | OUTPATIENT
Start: 2021-11-29 | End: 2021-11-29 | Stop reason: HOSPADM

## 2021-11-29 RX ORDER — FENTANYL CITRATE 50 UG/ML
INJECTION, SOLUTION INTRAMUSCULAR; INTRAVENOUS
Status: SHIPPED | OUTPATIENT
Start: 2021-11-29 | End: 2021-11-29

## 2021-11-29 RX ORDER — OXYCODONE HYDROCHLORIDE 5 MG/1
5 TABLET ORAL
Status: DISCONTINUED | OUTPATIENT
Start: 2021-11-29 | End: 2021-11-29 | Stop reason: HOSPADM

## 2021-11-29 RX ORDER — CEFADROXIL 500 MG/1
500 CAPSULE ORAL 2 TIMES DAILY
Qty: 10 CAPSULE | Refills: 0 | Status: SHIPPED | OUTPATIENT
Start: 2021-11-29 | End: 2021-12-04

## 2021-11-29 RX ORDER — SODIUM CHLORIDE 0.9 % (FLUSH) 0.9 %
5-40 SYRINGE (ML) INJECTION EVERY 8 HOURS
Status: DISCONTINUED | OUTPATIENT
Start: 2021-11-29 | End: 2021-11-29 | Stop reason: HOSPADM

## 2021-11-29 RX ORDER — OXYCODONE HYDROCHLORIDE 5 MG/1
5 TABLET ORAL
Qty: 60 TABLET | Refills: 0 | Status: SHIPPED | OUTPATIENT
Start: 2021-11-29 | End: 2021-12-06

## 2021-11-29 RX ORDER — DOCUSATE SODIUM 100 MG/1
100 CAPSULE, LIQUID FILLED ORAL 2 TIMES DAILY
Status: DISCONTINUED | OUTPATIENT
Start: 2021-11-29 | End: 2021-11-29 | Stop reason: HOSPADM

## 2021-11-29 RX ORDER — PROPOFOL 10 MG/ML
INJECTION, EMULSION INTRAVENOUS AS NEEDED
Status: DISCONTINUED | OUTPATIENT
Start: 2021-11-29 | End: 2021-11-29 | Stop reason: HOSPADM

## 2021-11-29 RX ORDER — ROPIVACAINE HYDROCHLORIDE 5 MG/ML
INJECTION, SOLUTION EPIDURAL; INFILTRATION; PERINEURAL
Status: SHIPPED | OUTPATIENT
Start: 2021-11-29 | End: 2021-11-29

## 2021-11-29 RX ORDER — SODIUM CHLORIDE, SODIUM LACTATE, POTASSIUM CHLORIDE, CALCIUM CHLORIDE 600; 310; 30; 20 MG/100ML; MG/100ML; MG/100ML; MG/100ML
125 INJECTION, SOLUTION INTRAVENOUS CONTINUOUS
Status: DISCONTINUED | OUTPATIENT
Start: 2021-11-29 | End: 2021-11-29 | Stop reason: HOSPADM

## 2021-11-29 RX ORDER — SODIUM CHLORIDE 9 MG/ML
125 INJECTION, SOLUTION INTRAVENOUS CONTINUOUS
Status: DISCONTINUED | OUTPATIENT
Start: 2021-11-29 | End: 2021-11-29 | Stop reason: HOSPADM

## 2021-11-29 RX ORDER — DEXAMETHASONE SODIUM PHOSPHATE 10 MG/ML
INJECTION INTRAMUSCULAR; INTRAVENOUS
Status: SHIPPED | OUTPATIENT
Start: 2021-11-29 | End: 2021-11-29

## 2021-11-29 RX ORDER — HYDROMORPHONE HYDROCHLORIDE 1 MG/ML
0.5 INJECTION, SOLUTION INTRAMUSCULAR; INTRAVENOUS; SUBCUTANEOUS AS NEEDED
Status: DISCONTINUED | OUTPATIENT
Start: 2021-11-29 | End: 2021-11-29 | Stop reason: HOSPADM

## 2021-11-29 RX ORDER — NALOXONE HYDROCHLORIDE 0.4 MG/ML
0.4 INJECTION, SOLUTION INTRAMUSCULAR; INTRAVENOUS; SUBCUTANEOUS AS NEEDED
Status: DISCONTINUED | OUTPATIENT
Start: 2021-11-29 | End: 2021-11-29 | Stop reason: HOSPADM

## 2021-11-29 RX ORDER — ASPIRIN 325 MG
325 TABLET ORAL 2 TIMES DAILY
Qty: 42 TABLET | Refills: 0 | Status: SHIPPED | OUTPATIENT
Start: 2021-11-29 | End: 2021-12-20

## 2021-11-29 RX ORDER — ASPIRIN 325 MG
325 TABLET, DELAYED RELEASE (ENTERIC COATED) ORAL 2 TIMES DAILY
Status: DISCONTINUED | OUTPATIENT
Start: 2021-11-29 | End: 2021-11-29 | Stop reason: HOSPADM

## 2021-11-29 RX ORDER — MIDAZOLAM HYDROCHLORIDE 1 MG/ML
INJECTION, SOLUTION INTRAMUSCULAR; INTRAVENOUS
Status: SHIPPED | OUTPATIENT
Start: 2021-11-29 | End: 2021-11-29

## 2021-11-29 RX ORDER — MELOXICAM 7.5 MG/1
7.5 TABLET ORAL 2 TIMES DAILY
Qty: 28 TABLET | Refills: 0 | Status: SHIPPED | OUTPATIENT
Start: 2021-11-29 | End: 2021-12-13

## 2021-11-29 RX ORDER — ACETAMINOPHEN 325 MG/1
650 TABLET ORAL EVERY 6 HOURS
Status: DISCONTINUED | OUTPATIENT
Start: 2021-11-29 | End: 2021-11-29 | Stop reason: HOSPADM

## 2021-11-29 RX ORDER — ONDANSETRON 2 MG/ML
4 INJECTION INTRAMUSCULAR; INTRAVENOUS
Status: DISCONTINUED | OUTPATIENT
Start: 2021-11-29 | End: 2021-11-29 | Stop reason: HOSPADM

## 2021-11-29 RX ORDER — SODIUM CHLORIDE 9 MG/ML
300 INJECTION, SOLUTION INTRAVENOUS CONTINUOUS
Status: DISCONTINUED | OUTPATIENT
Start: 2021-11-29 | End: 2021-11-29 | Stop reason: HOSPADM

## 2021-11-29 RX ORDER — ACETAMINOPHEN 500 MG
1000 TABLET ORAL
Status: COMPLETED | OUTPATIENT
Start: 2021-11-29 | End: 2021-11-29

## 2021-11-29 RX ORDER — LANOLIN ALCOHOL/MO/W.PET/CERES
1 CREAM (GRAM) TOPICAL 3 TIMES DAILY
Status: DISCONTINUED | OUTPATIENT
Start: 2021-11-29 | End: 2021-11-29 | Stop reason: HOSPADM

## 2021-11-29 RX ORDER — PANTOPRAZOLE SODIUM 40 MG/1
40 TABLET, DELAYED RELEASE ORAL DAILY
Status: DISCONTINUED | OUTPATIENT
Start: 2021-11-29 | End: 2021-11-29 | Stop reason: HOSPADM

## 2021-11-29 RX ORDER — FENTANYL CITRATE 50 UG/ML
50 INJECTION, SOLUTION INTRAMUSCULAR; INTRAVENOUS
Status: DISCONTINUED | OUTPATIENT
Start: 2021-11-29 | End: 2021-11-29 | Stop reason: HOSPADM

## 2021-11-29 RX ORDER — METOCLOPRAMIDE HYDROCHLORIDE 5 MG/ML
10 INJECTION INTRAMUSCULAR; INTRAVENOUS
Status: DISCONTINUED | OUTPATIENT
Start: 2021-11-29 | End: 2021-11-29 | Stop reason: HOSPADM

## 2021-11-29 RX ORDER — KETOROLAC TROMETHAMINE 30 MG/ML
15 INJECTION, SOLUTION INTRAMUSCULAR; INTRAVENOUS EVERY 6 HOURS
Status: DISCONTINUED | OUTPATIENT
Start: 2021-11-29 | End: 2021-11-29 | Stop reason: HOSPADM

## 2021-11-29 RX ADMIN — HYDROMORPHONE HYDROCHLORIDE 1 MG: 2 INJECTION, SOLUTION INTRAMUSCULAR; INTRAVENOUS; SUBCUTANEOUS at 14:45

## 2021-11-29 RX ADMIN — LIDOCAINE HYDROCHLORIDE 80 MG: 20 INJECTION, SOLUTION INTRAVENOUS at 14:45

## 2021-11-29 RX ADMIN — KETAMINE HYDROCHLORIDE 50 MG: 10 INJECTION, SOLUTION INTRAMUSCULAR; INTRAVENOUS at 14:45

## 2021-11-29 RX ADMIN — DEXMEDETOMIDINE HYDROCHLORIDE 25 MCG: 100 INJECTION, SOLUTION INTRAVENOUS at 12:45

## 2021-11-29 RX ADMIN — SODIUM CHLORIDE, POTASSIUM CHLORIDE, SODIUM LACTATE AND CALCIUM CHLORIDE 1000 ML: 600; 310; 30; 20 INJECTION, SOLUTION INTRAVENOUS at 12:05

## 2021-11-29 RX ADMIN — DEXAMETHASONE SODIUM PHOSPHATE 4 MG: 10 INJECTION, SOLUTION INTRAMUSCULAR; INTRAVENOUS at 12:45

## 2021-11-29 RX ADMIN — ROPIVACAINE HYDROCHLORIDE 15 ML: 5 INJECTION, SOLUTION EPIDURAL; INFILTRATION; PERINEURAL at 12:47

## 2021-11-29 RX ADMIN — ACETAMINOPHEN 1000 MG: 500 TABLET ORAL at 11:47

## 2021-11-29 RX ADMIN — MIDAZOLAM 2 MG: 1 INJECTION INTRAMUSCULAR; INTRAVENOUS at 12:45

## 2021-11-29 RX ADMIN — METOCLOPRAMIDE HYDROCHLORIDE 10 MG: 5 INJECTION INTRAMUSCULAR; INTRAVENOUS at 18:09

## 2021-11-29 RX ADMIN — PANTOPRAZOLE SODIUM 40 MG: 40 TABLET, DELAYED RELEASE ORAL at 11:47

## 2021-11-29 RX ADMIN — FENTANYL CITRATE 100 MCG: 50 INJECTION, SOLUTION INTRAMUSCULAR; INTRAVENOUS at 12:45

## 2021-11-29 RX ADMIN — SODIUM CHLORIDE, POTASSIUM CHLORIDE, SODIUM LACTATE AND CALCIUM CHLORIDE 125 ML/HR: 600; 310; 30; 20 INJECTION, SOLUTION INTRAVENOUS at 12:05

## 2021-11-29 RX ADMIN — PROPOFOL 200 MG: 10 INJECTION, EMULSION INTRAVENOUS at 14:45

## 2021-11-29 RX ADMIN — KETOROLAC TROMETHAMINE 15 MG: 30 INJECTION, SOLUTION INTRAMUSCULAR at 18:09

## 2021-11-29 RX ADMIN — TRANEXAMIC ACID 1950 MG: 650 TABLET ORAL at 11:47

## 2021-11-29 RX ADMIN — CEFAZOLIN 2 G: 1 INJECTION, POWDER, FOR SOLUTION INTRAVENOUS at 14:39

## 2021-11-29 RX ADMIN — ROPIVACAINE HYDROCHLORIDE 20 ML: 5 INJECTION, SOLUTION EPIDURAL; INFILTRATION; PERINEURAL at 12:45

## 2021-11-29 RX ADMIN — ONDANSETRON HYDROCHLORIDE 4 MG: 2 INJECTION INTRAMUSCULAR; INTRAVENOUS at 14:47

## 2021-11-29 NOTE — OP NOTES
[address] [facility] [facility]  Total Knee Arthroplasty    Patient: Dickson Barrera MRN: [id number]  SSN: [id number]    YOB: 1945  Age: 78 y.o. Sex: female      Date of Surgery: 11/29/2021   Preoperative Diagnosis: RIGHT KNEE OSTEOARTHRITIS   Postoperative Diagnosis: RIGHT KNEE OSTEOARTHRITIS   [facility]: [facility]  Surgeon: Ct Escalante MD  Assistant: Wai Beck PA-C    Anesthesia: General and adductor canal Nerve Block    Procedure: Total Knee Arthroplasty:   The complexity of the total joint surgery requires the use of a first assistant for positioning, retraction and assistance in closure. Tourniquet Time: Tourniquet not used. Estimated Blood Loss: Less than 100cc     Implants:   Implant Name Type Inv. Item Serial No.  Lot No. LRB No. Used Action   CEMENT BNE 40GM FULL DOSE PMMA W/O ANTIBIO HI VISC N RADPQ - [id number]  CEMENT BNE 40GM FULL DOSE PMMA W/O ANTIBIO HI VISC N RADPQ  [employer]WD [id number] Right 1 Implanted   CEMENT BNE 40GM FULL DOSE PMMA W/O ANTIBIO HI VISC N RADPQ - [id number]  CEMENT BNE 40GM FULL DOSE PMMA W/O ANTIBIO HI VISC N RADPQ  [employer]WD [id number] Right 1 Implanted   TRAY TIB SZ 3 NP A BAL KNEE - [id number]  TRAY TIB SZ 3 NP A BAL KNEE  [employer] [id number] Right 1 Implanted   INSERT TIB SZ 3 H10MM UCONG E VITALIZE - [id number]  INSERT TIB SZ 3 H10MM UCONG E VITALIZE  [employer] FP Complete_WD [id number] Right 1 Implanted   COMPONENT FEM SZ 4 R NP CRUCE RET BKS TRIMAX - [id number]  COMPONENT FEM SZ 4 R NP CRUCE RET BKS TRIMAX  [employer]WD [id number] Right 1 Implanted   COMPONENT PATELLAR 29 MM KNEE E-VITALIZE BKS TRIMAX - [id number]  COMPONENT PATELLAR 29 MM KNEE E-VITALIZE BKS TRIMAX  [employer] [id number] Right 1 Implanted        Specimens: None    Additional Findings: None     Complications: none    Body Mass Index:  Body mass index is 39.01 kg/m². Procedure Detail:  Prior to the surgery the patient was administered a femoral nerve block in the preoperative holding area by the anesthesiologist. Ganesh Camacho was brought to the operating room and positioned on the operating table. She was anesthetized with anesthesia. Intravenous antibiotics were administered. Prior to the incision being made a timeout was called identifying the patient, procedure, operative side, and surgeon. A pneumatic tourniquet was placed about the limb and the right leg was prepped and draped in the usual sterile manner. The tourniquet was not inflated throughout the case. A midline anterior incision made over the knee. The incision was carried down through the subcutaneous tissue to the underlying capsule. A medial parapatellar capsular incision was performed. The medial capsular flap was carefully elevated around to the posterior medial corner protecting the medial collateral ligaments and the fibers. The patella was sized with a caliper, and approximately 10-12 mm was resected with an oscillating saw allowing the patella to be slid into the lateral gutter. It was not everted throughout the case. Our attention was first turned to the distal femur and using intermedullary instrumentation, a 5-degree valgus cut was on the distal end of the femur. The distal end of the femur was sized to a size 4 femoral component. Pins were inserted through the sizer and the corresponding 4-in-1 block was slid into place and pinned for stability. Anterior and posterior and chamfer cuts were made to accommodate the femoral component. The medial and lateral menisci were excised as were the anterior cruciate ligaments. Our attention was then turned to the tibia. Using extramedullary instrumentation, a 3-degree cut was made on the proximal end of the tibia.  A spacer block was placed to show gaps had been balanced and a size 3  tibial base plate was placed on the tibia and pinned into place. Intramedullary reaming guide was placed on the tibia and the appropriate reamer was used followed by the keel punch to complete the preparation of the tibia. A trial femoral component was then impacted on to the distal end of the femur. Trial reduction was then performed with incremental size trial bearing surfaces. The orthodevelopment BKS trimax UC 10mm bearing surface was inserted and allowed for full extension, good medial, lateral stability at 90 degrees of flexion, especially medially. Our attention was then turned to the patella. The patella was sized to a 29mm patella. The guide was pinned, placed over patella, 3 holes were drilled. Trial patella button was inserted and the patella was reduced in the knee. The patella tracked normally using no-touch technique. The trial components were then all removed. The real components were opened on the back. The cut surfaces of the bone were prepared using the PulsaVac lavage. Prior to this, the Aquamantys was used to cauterize any soft tissue bleeding. 40 grams of Depuy HV cement was mixed. The femoral and tibial components  and patellar component was cemented into place. Excess cement was removed from around the edge of bone using plastic curette. Once the cement had hardened, the knee was placed through range of motion and noted to be stable as mentioned above with the trail components. The wound was dry, therefore no drain was used. The operative knee was injected with 20 cc of exparel. The knee was then soaked with a diluted betadine solution for approximately 3 min. This was then thoroughly irrigated. The capsular layer was closed using a #2 stratafix suture with the knee flexed 90 degrees, while subcutaneous layers were closed using 2-0 Vicryl suture. Finally the skin was closed using Prineo, which were applied in occlusive fashion and sterile bandage applied. All sponge and needle counts were correct.   She was taken to the recovery room extubated in stable condition.     Signed By: Odilia Huerta MD     November 29, 2021

## 2021-11-29 NOTE — ANESTHESIA PREPROCEDURE EVALUATION
Anesthetic History     PONV          Review of Systems / Medical History  Patient summary reviewed, nursing notes reviewed and pertinent labs reviewed    Pulmonary        Sleep apnea      Pertinent negatives: No COPD and asthma     Neuro/Psych   Within defined limits        Pertinent negatives: No seizures and CVA   Cardiovascular    Hypertension: well controlled        Dysrhythmias (RBBB, LPFB)     Pertinent negatives: No past MI and CHF  Exercise tolerance: >4 METS     GI/Hepatic/Renal     GERD: well controlled        Pertinent negatives: No liver disease and renal disease   Endo/Other        Morbid obesity and arthritis  Pertinent negatives: No diabetes   Other Findings              Physical Exam    Airway  Mallampati: II  TM Distance: 4 - 6 cm  Neck ROM: normal range of motion   Mouth opening: Normal     Cardiovascular    Rhythm: regular  Rate: normal         Dental  No notable dental hx       Pulmonary  Breath sounds clear to auscultation               Abdominal  Abdominal exam normal       Other Findings            Anesthetic Plan    ASA: 3  Anesthesia type: general      Post-op pain plan if not by surgeon: peripheral nerve block single    Induction: Intravenous  Anesthetic plan and risks discussed with: Patient

## 2021-11-29 NOTE — INTERVAL H&P NOTE
Update History & Physical    The Patient's History and Physical of November 21, 2021 was reviewed with the patient and I examined the patient. There was no change. The surgical site was confirmed by the patient and me. Plan:  The risk, benefits, expected outcome, and alternative to the recommended procedure have been discussed with the patient. Patient understands and wants to proceed with the procedure.     Electronically signed by Iris Godoy MD on 11/29/2021 at 11:13 AM

## 2021-11-29 NOTE — ANESTHESIA PROCEDURE NOTES
Peripheral Block    Start time: 11/29/2021 12:42 PM  End time: 11/29/2021 12:45 PM  Performed by: Mi Rehman MD  Authorized by: Mi Rehman MD       Pre-procedure: Indications: at surgeon's request and post-op pain management    Preanesthetic Checklist: patient identified, risks and benefits discussed, site marked, timeout performed, anesthesia consent given and patient being monitored    Timeout Time: 12:42 EST          Block Type:   Block Type: Adductor canal block  Laterality:  Right  Monitoring:  Standard ASA monitoring, responsive to questions, oxygen, continuous pulse ox, frequent vital sign checks and heart rate  Injection Technique:  Single shot  Procedures: ultrasound guided    Patient Position: supine  Prep: chlorhexidine    Location:  Mid thigh  Needle Type:  Stimuplex  Needle Gauge:  20 G  Needle Localization:  Ultrasound guidance  Medication Injected:  FentaNYL citrate (PF) injection sedation initial, 100 mcg  midazolam (VERSED) injection, 2 mg  ropivacaine (PF) (NAROPIN) 5 mg/mL (0.5 %) injection, 20 mL  dexamethasone (PF) (DECADRON) 10 mg/mL injection, 4 mg  dexmedeTOMidine (PRECEDEX) 100 mcg/mL iv solution, 25 mcg  Med Admin Time: 11/29/2021 12:45 PM    Assessment:  Number of attempts:  1  Injection Assessment:  Incremental injection every 5 mL, negative aspiration for CSF, no paresthesia, ultrasound image on chart, local visualized surrounding nerve on ultrasound, negative aspiration for blood and no intravascular symptoms  Patient tolerance:  Patient tolerated the procedure well with no immediate complications  Ultrasound guidance was used to view and verify medication disbursement and was also used for needle placement.

## 2021-11-29 NOTE — ANESTHESIA PROCEDURE NOTES
Peripheral Block    Start time: 11/29/2021 12:45 PM  End time: 11/29/2021 12:47 PM  Performed by: Shruti Pascual MD  Authorized by: Shruti Pascual MD       Pre-procedure: Indications: at surgeon's request and post-op pain management    Preanesthetic Checklist: patient identified, risks and benefits discussed, site marked, timeout performed, anesthesia consent given and patient being monitored    Timeout Time: 12:45 EST          Block Type:   Block Type:  IPACK  Laterality:  Right  Monitoring:  Responsive to questions, oxygen, continuous pulse ox, frequent vital sign checks, heart rate and standard ASA monitoring  Injection Technique:  Single shot  Procedures: ultrasound guided    Patient Position: supine  Prep: chlorhexidine    Location:  Lower thigh  Needle Type:  Stimuplex  Needle Gauge:  20 G  Needle Localization:  Ultrasound guidance  Medication Injected:  Ropivacaine (PF) (NAROPIN) 5 mg/mL (0.5 %) injection, 15 mL  Med Admin Time: 11/29/2021 12:47 PM    Assessment:  Number of attempts:  1  Injection Assessment:  Incremental injection every 5 mL, negative aspiration for CSF, no paresthesia, ultrasound image on chart, local visualized surrounding nerve on ultrasound, negative aspiration for blood and no intravascular symptoms  Patient tolerance:  Patient tolerated the procedure well with no immediate complications  Ultrasound guidance was used to view and verify medication disbursement and was also used for needle placement.

## 2021-11-29 NOTE — ANESTHESIA POSTPROCEDURE EVALUATION
Post-Anesthesia Evaluation and Assessment    Cardiovascular Function/Vital Signs  Visit Vitals  BP (!) 123/50   Pulse 80   Temp 36.6 °C (97.8 °F)   Resp 17   Ht 5' 3.75\" (1.619 m)   Wt 102.3 kg (225 lb 8 oz)   SpO2 95%   BMI 39.01 kg/m²       Patient is status post Procedure(s):  RIGHT TOTAL KNEE REPLACEMENT. Nausea/Vomiting: Controlled. Postoperative hydration reviewed and adequate. Pain:  Pain Scale 1: Numeric (0 - 10) (11/29/21 1630)  Pain Intensity 1: 1 (11/29/21 1630)   Managed. Neurological Status:   Neuro (WDL): Within Defined Limits (11/29/21 1610)   At baseline. Mental Status and Level of Consciousness: Arousable. Pulmonary Status:   O2 Device: Nasal cannula (11/29/21 1610)   Adequate oxygenation and airway patent. Complications related to anesthesia: None    Post-anesthesia assessment completed. No concerns. Patient has met all discharge requirements.     Signed By: Edyta Schroeder CRNA    November 29, 2021

## 2021-11-29 NOTE — PROGRESS NOTES
5  Pt ambulated in room from stretcher to recliner without complications. 1715  Dual AVS reviewed with TAMMY Velasquez rn. All medications reviewed individually with patient. Opportunities for questions and concerns provided. Patient to be discharged via (mode of transport ie. Car, ambulance or air transport) car. Patient's arm band appropriately discarded.       IV remains as pt needs to void post op and see PT    5752  PT/OT in to see pt     1810  Pt has passed PT but has not voided post op     1825  Pt attempting to void at this time

## 2021-11-29 NOTE — ROUTINE PROCESS
TRANSFER - IN REPORT:    Verbal report received from MICHAEL Navarrete rn(name) on Vester Inches  being received from PACU(unit) for routine post - op      Report consisted of patients Situation, Background, Assessment and   Recommendations(SBAR). Information from the following report(s) SBAR, Kardex, [facility] and Recent Results was reviewed with the receiving nurse. Opportunity for questions and clarification was provided. Assessment completed upon patients arrival to unit and care assumed.

## 2021-11-29 NOTE — DISCHARGE SUMMARY
[address] [facility] [place] [address]     DISCHARGE SUMMARY     PATIENT: Murphy Landeros     MRN: [id number]   ADMIT DATE: 2021   BILLIN   DISCHARGE DATE:      ATTENDING: Ernestine Mendoza MD   DICTATING: Isabel Taylor 1723, PA         ADMISSION DIAGNOSIS: Primary localized osteoarthritis of right knee [M17.11]    DISCHARGE DIAGNOSIS: Status post RIGHT TOTAL KNEE ARTHROPLASTY    HISTORY OF PRESENT ILLNESS: The patient is a 78y.o. year-old female   with ongoing right knee pain secondary to osteoarthritis of her right knee. The patient's pain has persisted and progressed despite conservative treatments and therapies. The patient has at this time opted for surgical intervention.     PAST MEDICAL HISTORY:   Past Medical History:   Diagnosis Date    Adverse effect of anesthesia     BLOOD PRESSURE DROPS    Adverse effect of anesthesia     5/10/18-pt states \"last 2 surgeries my blood pressure bottomed out\"    Atypical ductal hyperplasia of left breast     Atypical lobular hyperplasia of right breast     Cardiac arrhythmia     Colon polyps     Esophageal reflux     Exercise tolerance finding 05/10/2018     2 flights of stairs-no chest pain and no SOB     GERD (gastroesophageal reflux disease)     diet controlled    Heart murmur     Hypertension     Hypertension     Mixed incontinence urge and stress (male)(female)     Nausea & vomiting     Nocturia     OAB (overactive bladder)     Osteopenia     H/O--LAST TEST RESULTS WNL    Primary localized osteoarthritis of right knee 2021    Right bundle branch block     Sleep apnea     uses a c-pap    Stress incontinence        PAST SURGICAL HISTORY:   Past Surgical History:   Procedure Laterality Date    COLONOSCOPY N/A 2018    COL[facility] at [facility] ENDOSCOPY    HX ANKLE FRACTURE [address]      left    HX BLADDER SUSPENSION      HX BREAST REDUCTION Bilateral 10/19/2016    bilateral inferior pedicle BREAST REDUCTION performed [facility] Nay [facility] MD at [facility] MA[facility] OR    HX COLONOSCOPY      HX GI      EGD AND COLONSCOPY    HX HYSTERECTOMY      HX KNEE ARTHROSCOPY  2011    Right- meniscus repair    HX OOPHORECTOMY      HX ORTHOPAEDIC Bilateral 2013    CTR    HX UROLOGICAL      bladder suspension    US GUIDED CORE BREAST BIOPSY      right  benign       ALLERGIES:   Allergies   Allergen Reactions    Codeine Swelling    Cortisone Palpitations     Injections    Pcn [Penicillins] Hives        CURRENT MEDICATIONS:  A list of medications prior to the time of admission include:  Prior to Admission medications    Medication Sig Start Date End Date Taking? Authorizing Provider   aspirin (ASPIRIN) 325 mg tablet Take 1 Tablet by mouth two (2) times a day for 21 days. 11/29/21 12/20/21 Yes Brian Bejarano PA   cefadroxil (DURICEF) 500 mg capsule Take 1 Capsule by mouth two (2) times a day for 5 days. 11/29/21 12/4/21 Yes Brian Bejarano PA   meloxicam (MOBIC) 7.5 mg tablet Take 1 Tablet by mouth two (2) times a day for 14 days. 11/29/21 12/13/21 Yes Brian Bejarano PA   oxyCODONE IR (ROXICODONE) 5 mg immediate release tablet Take 1 Tablet by mouth every four (4) hours as needed for Pain for up to 7 days. Max Daily Amount: 30 mg. 11/29/21 12/6/21 Yes Brian Bejarano PA   mirabegron ER (Myrbetriq) 50 mg ER tablet Take 50 mg by mouth nightly. Yes Provider, Historical   amLODIPine (NORVASC) 10 mg tablet  10/31/20  Yes Provider, Historical   chlorthalidone (HYGROTON) 25 mg tablet Take 25 mg by mouth daily. Indications: high blood pressure    Provider, Historical   tamoxifen (NOLVADEX) 20 mg tablet Take 20 mg by mouth daily. Provider, Historical   losartan (COZAAR) 100 mg tablet  6/22/19   Provider, Historical   methylcellulose (CITRUCEL PO) Take  by mouth. Provider, Historical   cholecalciferol, VITAMIN D3, (VITAMIN D3) 5,000 unit tab tablet Take  by mouth daily. Provider, Historical   cyanocobalamin 1,000 mcg tablet Take 2,500 mcg by mouth daily. Provider, Historical   vit C/vit E/lutein/min/omega-3 (OCUVITE PO) Take  by mouth. Provider, Historical   aspirin delayed-release 81 mg tablet Take  by mouth daily. Provider, Historical   omega-3 fatty acids-vitamin e (FISH OIL) 1,000 mg cap Take 1 Cap by mouth daily. Provider, Historical   multivitamin (ONE A DAY) tablet Take 1 Tab by mouth daily. Provider, Historical   calcium 500 mg tab Take 500 mg by mouth two (2) times a day. Provider, Historical       FAMILY HISTORY:   Family History   Problem Relation Age of Onset    Colon Cancer Mother     Hypertension Mother     Osteoporosis Mother     Parkinson's Disease Mother     Cancer Father         BONE AND TESTICULAR     Hypertension Father     Hypertension Brother     Cancer Maternal Grandmother     Hypertension Maternal Grandmother     Diabetes Maternal Grandfather     Heart Disease Maternal Grandfather        SOCIAL HISTORY:   Social History     Socioeconomic History    Marital status:    Tobacco Use    Smoking status: Never Smoker    Smokeless tobacco: Never Used   Vaping Use    Vaping Use: Never used   Substance and Sexual Activity    Alcohol use: No     Alcohol/week: 0.0 standard drinks    Drug use: No    Sexual activity: Yes     Partners: Male       REVIEW OF SYSTEMS: All review of systems are negative. PHYSICAL EXAMINATION: For a detailed physical exam, please refer to the patient's chart. HOSPITAL COURSE: The patient was taken to surgery the day of admission. she underwent a right total knee replacement. Operative course was benign. Estimated blood loss was approximately 150 cc. The patient was taken to the PACU in stable condition and was later taken to the floor in stable condition. During her hospital stay, the patient progressed well with physical therapy and occupational therapy, adherent to instructions.  she had been cleared by physical therapy with stair training. she was placed on Aspirin for DVT prophylaxis. her pain has been well controlled with oral pain medications. her vitals have remained stable. she has also remained hemodynamically stable. The patient has been recommended for discharge home. DISCHARGE INSTRUCTIONS: The patient is to be discharged home. she is to continue on her prior medications per the medication reconciliation form, to which we will add:   1. Aspirin 325 mg; 1 tablet po bid x 21 days  2. Mobic 7.5 mg; 1 tablet po bid x 14 days  3. Roxicodone 5 mg; 1 tablets p.o. every 4 hours p.r.n. for pain  4. Duricef 500 mg; 1 tablet p.o. every 12 hours x 5 days    The patient is to continue at home with home physical therapy 3 times a week to work on gait training, range of motion, strengthening, and weightbearing exercises as tolerated on her right lower extremity. The patient is to progress from a walker to a cane to complete total weightbearing as tolerable. The patient is to continue to keep her incision dry. The patient is to followup with Dr. Capo Lyons, Yaritza Darden PA-C and/or Juan Carlos England PA-C in the office approximately 10-14 days status post for x-rays and further evaluation.     Isabel Taylor 4911, [address]  [id number] PM

## 2021-11-29 NOTE — PERIOP NOTES
Reviewed PTA medication list with patient/caregiver and patient/caregiver denies any additional medications. Patient admits to having a responsible adult care for them at home for at least 24 hours after surgery. Patient encouraged to use gown warming system and informed that using said warming gown to regulate body temperature prior to a procedure has been shown to help reduce the risks of blood clots and infection. Patient's pharmacy of choice verified and documented in PTA medication section. Dual skin assessment & fall risk band verification completed with Ger NGUYEN.

## 2021-11-29 NOTE — PROGRESS NOTES
1710  Same Day Discharge     - Patient arrives to unit at this time. Dual skin assessment completed with TAMMY Velasquez rn, no abnormalities noted other than surgical incision to Right knee. mepilex silver dressing CDI. Assumed care of pt at this time. Assessment complete. Pt alert and oriented x 4. Shows no sign of distress. Fall risk arm band in place. Denies SOB and chest pain. Pt lungs clear bilaterally. Cap refill  less than 3 seconds. Pt denies numbness and tingling to all extremities. Stated pain 1/10. Pt has 18 G IV to R forearm. Patient oriented to room to include use of call bell, meal ordering, and use of incentive spirometer, patient able to get IS to 1750. Patient instructed to order lunch and given explanation of home for dinner plan. Phone and call bell left within reach. Educated on pain medication availability and possible side effects, as well as need to call for assistance before getting out of bed. Patient verbalized understanding.      Symptoms present on arrival:  [] Pain 1/10   [] Medicated  [] Nausea   [] Medicated   [] Hypotension/Hypertension   [] Provider notified

## 2021-11-29 NOTE — PERIOP NOTES
TRANSFER - OUT REPORT:    Verbal report given to Monica NGUYEN on Paris Yusuf  being transferred to [address] [place] [address] for routine post - op       Report consisted of patients Situation, Background, Assessment and   Recommendations(SBAR). Information from the following report(s) SBAR, OR Summary, Procedure Summary, Intake/Output and MAR was reviewed with the receiving nurse. Lines:   Peripheral IV 11/29/21 Right; Inner Forearm (Active)   Site Assessment Clean, dry, & intact 11/29/21 1631   Phlebitis Assessment 0 11/29/21 1631   Infiltration Assessment 0 11/29/21 1631   Dressing Status Clean, dry, & intact 11/29/21 1631   Dressing Type Transparent; Tape 11/29/21 1631   Hub Color/Line Status Green; Infusing 11/29/21 1631      Date 11/28/21 0700 - 11/29/21 0659(Not Admitted) 11/29/21 0700 - 11/30/21 0659   Shift 6021-1776 7915-6616 24 Hour Total 8959-8558 2125-2376 24 Hour Total   INTAKE   I.V.    1600  1600     I.V.    1000  1000     Volume (lactated Ringers infusion)    600  600   Shift Total(mL/kg)    1600(15.6)  1600(15.6)   OUTPUT   Urine           Urine Occurrence(s)    1 x  1 x   Blood    250  250     Estimated Blood Loss    250  250   Shift Total(mL/kg)    250(2.4)  250(2.4)   NET    1350  1350   Weight (kg)    102.3 102.3 102.3       Opportunity for questions and clarification was provided.       Patient transported with:   Registered Nurse

## 2021-11-30 ENCOUNTER — HOME CARE VISIT (OUTPATIENT)
Dept: SCHEDULING | Facility: HOME HEALTH | Age: 76
End: 2021-11-30
Payer: MEDICARE

## 2021-11-30 ENCOUNTER — HOME CARE VISIT (OUTPATIENT)
Dept: HOME HEALTH SERVICES | Facility: HOME HEALTH | Age: 76
End: 2021-11-30

## 2021-11-30 VITALS
DIASTOLIC BLOOD PRESSURE: 42 MMHG | SYSTOLIC BLOOD PRESSURE: 140 MMHG | TEMPERATURE: 98 F | RESPIRATION RATE: 12 BRPM | HEART RATE: 68 BPM | OXYGEN SATURATION: 95 %

## 2021-11-30 PROCEDURE — 400013 HH SOC

## 2021-11-30 PROCEDURE — G0151 HHCP-SERV OF PT,EA 15 MIN: HCPCS

## 2021-11-30 PROCEDURE — 400018 HH-NO PAY CLAIM PROCEDURE

## 2021-11-30 PROCEDURE — 3331090002 HH PPS REVENUE DEBIT

## 2021-11-30 PROCEDURE — A6213 FOAM DRG >16<=48 SQ IN W/BDR: HCPCS

## 2021-11-30 PROCEDURE — 3331090001 HH PPS REVENUE CREDIT

## 2021-11-30 NOTE — PROGRESS NOTES
Problem: Mobility Impaired (Adult and Pediatric)  Goal: *Acute Goals and Plan of Care (Insert Text)  Description: Physical Therapy Goals   Initiated 11/29/2021 and to be accomplished within 2 day(s)  Pt will be able to perform supine<>sit SBA for home performance at PR. Pt will be able to perform sit<>stand SBA for increased ability to transfer at home safely. Pt will be able to participate in gt training >50' w/RW, WBAT, GB and CGA/SBA for improved functional mobility at d/c. Pt will be able to perform stair training 3 -5 steps using step to pattern, HR rail and CGA for safe home entry at d/c. Pt will be educated regarding HEP per MD protocol for optimal AROM/strength outcomes. Outcome: Progressing Towards Goal  [x]  Patient has met MD mobilization critieria for d/c home   [x]  Recommend HH with 24 hour adult care   []  Benefit from additional acute PT session to address:      PHYSICAL THERAPY EVALUATION    Patient: Samara Lemus (78 y.o. female)  Date: 11/29/2021  Primary Diagnosis: Primary localized osteoarthritis of right knee [M17.11]  Procedure(s) (LRB):  RIGHT TOTAL KNEE REPLACEMENT (Right) Day of Surgery   Precautions:   Fall, WBAT  WBAT  PLOF: amb with no AD and independently PTA    ASSESSMENT :  Based on the objective data described below, the patient presents with decreased ROM/motor performance RLE, decrease functional mobility with regard to bed mobility, transfers, and gait following above surgery. Reports pain 2/10 pre, 6/10 during GT and 3/10 post session. VS WNL. Pt seen with OT for second set of skilled hands and completed dressing tasks at start of session. Pt demonstrates transfers CGA/SBA/vc. Able to participate in GT/RW/CGA/SBA  56 ft, with slow antalgic tila and step to/reciprocal gt pattern. Verbal and tactile cues for safety and sequencing given. Pt educated in and performed step nego as noted below; note pt spouse reports pt son will assist with home re-entry.   Also educated in demonstrates HEP accurately as noted below. Pt left seated in chair with ice in place, all needs in reach and nurse Monica notified. Recommend HHPT for follow up physical therapy upon discharge to reach maximal level of independence/safety with functional mobility. Patient education: Mobility safety, WB, HEP, ice application/use, elevation, environmental safety and home safety techniques. Patient will benefit from skilled intervention to address the above impairments. Patient's rehabilitation potential is considered to be Good  Factors which may influence rehabilitation potential include:   []         None noted  []         Mental ability/status  []         Medical condition  []         Home/family situation and support systems  []         Safety awareness  [x]         Pain tolerance/management  []         Other:      PLAN :  Recommendations and Planned Interventions:   [x]           Bed Mobility Training             []    Neuromuscular Re-Education  [x]           Transfer Training                   []    Orthotic/Prosthetic Training  [x]           Gait Training                          []    Modalities  [x]           Therapeutic Exercises           []    Edema Management/Control  [x]           Therapeutic Activities            [x]    Family Training/Education  [x]           Patient Education  []           Other (comment):    Frequency/Duration: Patient will be followed by physical therapy 1-2 times per day/4-7 days per week to address goals. Discharge Recommendations: Home Physical Therapy  Further Equipment Recommendations for Discharge: N/A     SUBJECTIVE:   Patient stated Doing good.     OBJECTIVE DATA SUMMARY:     Past Medical History:   Diagnosis Date    Adverse effect of anesthesia     BLOOD PRESSURE DROPS    Adverse effect of anesthesia     5/10/18-pt states \"last 2 surgeries my blood pressure bottomed out\"    Atypical ductal hyperplasia of left breast     Atypical lobular hyperplasia of right breast     Cardiac arrhythmia     Colon polyps     Esophageal reflux     Exercise tolerance finding 05/10/2018     2 flights of stairs-no chest pain and no SOB     GERD (gastroesophageal reflux disease)     diet controlled    Heart murmur     Hypertension     Hypertension     Mixed incontinence urge and stress (male)(female)     Nausea & vomiting     Nocturia     OAB (overactive bladder)     Osteopenia     H/O--LAST TEST RESULTS WNL    Primary localized osteoarthritis of right knee 11/21/2021    Right bundle branch block     Sleep apnea     uses a c-pap    Stress incontinence      Past Surgical History:   Procedure Laterality Date    COLONOSCOPY N/A 9/12/2018    COLONOSCOPY performed by Caroline Oropeza MD at [facility] ENDOSCOPY    [address] [facility]  2001    left    HX BLADDER SUSPENSION      HX BREAST REDUCTION Bilateral 10/19/2016    bilateral inferior pedicle BREAST REDUCTION performed by Eliceo Holland MD at [facility] MAIN OR    HX COLONOSCOPY      HX GI      EGD AND COLONSCOPY    HX HYSTERECTOMY      HX KNEE ARTHROSCOPY  2011    Right- meniscus repair    HX OOPHORECTOMY      HX ORTHOPAEDIC Bilateral 2013    CTR    HX UROLOGICAL      bladder suspension    US GUIDED CORE BREAST BIOPSY      right  benign     Barriers to Learning/Limitations: yes;  physical  Compensate with: Visual Cues, Verbal Cues, and Tactile Cues  Home Situation:  Home Situation  Home Environment: Private residence  # Steps to Enter: 2  Rails to Enter: No (uses ledge on R)  One/Two Story Residence: One story  Lift Chair Available: No  Living Alone: No  Support Systems: Spouse/Significant Other  Patient Expects to be Discharged to[de-identified] House  Current DME Used/Available at Home: [employer] [place], straight, [employer] [place], rolling, Shower chair  Tub or Shower Type: Shower  Critical Behavior:  Neurologic State: Alert  Orientation Level: Oriented X4  Cognition: Follows commands  Safety/Judgement: Awareness of environment;  Fall prevention  Psychosocial  Patient Behaviors: Calm; Cooperative  Family  Behaviors: Calm; Supportive  Skin Condition/Temp: Dry; Warm  Family  Behaviors: Calm; Supportive  Skin Integrity: Incision (comment)  Skin Integumentary  Skin Color: Appropriate for ethnicity  Skin Condition/Temp: Dry; Warm  Skin Integrity: Incision (comment)  Strength:    Strength: Generally decreased, functional  Tone & Sensation:   Tone: Normal  Sensation: Impaired (R knee)  Range Of Motion:  AROM: Generally decreased, functional  Functional Mobility:  Bed Mobility:  Scooting: Stand-by assistance; Supervision  Transfers:  Sit to Stand: Contact guard assistance; Stand-by assistance (vc)  Stand to Sit: Contact guard assistance; Stand-by assistance (vc)  Balance:   Sitting: Intact  Standing: Intact; With support  Ambulation/Gait Training:  Distance (ft): 56 Feet (ft)  Assistive Device: Gait belt; Walker, rolling  Ambulation - Level of Assistance: Contact guard assistance; Stand-by assistance  Gait Description (WDL): Exceptions to WDL  Gait Abnormalities: Antalgic; Decreased step clearance; Step to gait (to reciprocal gt)  Speed/Joyce: Pace decreased (<100 feet/min)  Interventions: Safety awareness training; Tactile cues; Verbal cues; Visual/Demos  Stairs:  Number of Stairs Trained: 5  Stairs - Level of Assistance: Contact guard assistance  Rail Use: Both (and R with min HHA to L)  Therapeutic Exercises:   Educated in and performed AP's accurately bilat. Pain:  Pain level pre-treatment: 2/10   Pain level post-treatment: 3/10   Pain Intervention(s) : Medication (see MAR); Rest, Ice, Repositioning  Response to intervention: Nurse notified, See doc flow  Activity Tolerance:   Good   Please refer to the flowsheet for vital signs taken during this treatment.   After treatment:   [x]         Patient left in no apparent distress sitting up in chair  []         Patient left in no apparent distress in bed  [x]         Call bell left within reach  [x]         Nursing notified  [x] Caregiver present  []         Bed alarm activated  []         SCDs applied    COMMUNICATION/EDUCATION:   [x]         Role of Physical Therapy in the acute care setting. [x]         Fall prevention education was provided and the patient/caregiver indicated understanding. [x]         Patient/family have participated as able in goal setting and plan of care. [x]         Patient/family agree to work toward stated goals and plan of care. []         Patient understands intent and goals of therapy, but is neutral about his/her participation. []         Patient is unable to participate in goal setting/plan of care: ongoing with therapy staff.  []         Other:     Thank you for this referral.  Pedro Kamara, PT   Time Calculation: 25 mins      Eval Complexity: History: HIGH Complexity :3+ comorbidities / personal factors will impact the outcome/ POC Exam:LOW Complexity : 1-2 Standardized tests and measures addressing body structure, function, activity limitation and / or participation in recreation  Presentation: LOW Complexity : Stable, uncomplicated  Clinical Decision Making:Low Complexity    Overall Complexity:LOW

## 2021-11-30 NOTE — Clinical Note
office notified that PT Admit completed 11/30/21. Medications reconciled and all medications are available in the home this visit. The following education was provided regarding medications, medication interactions, and look a like medications. Medications are effective at this time. no severe interactions noted. Educted pt to take oxycodone as prescribed. Pt presents with decreased strength and AROM R LE. A requied with all transfers and bed mobility skills, ans stairs. Pt is amb limited distances with RW with A. Pt presents with decreased dynamic standing balance. Pt will be seen to establish and upgrade HEP. Gait training with the least restrictive A DEV on flat and uneven surfaces. Bed mobility training, transfer training. Stair training. Dynamic standing balance re -education. Reinforece general safety precautions.

## 2021-11-30 NOTE — PROGRESS NOTES
Problem: Self Care Deficits Care Plan (Adult)  Goal: *Acute Goals and Plan of Care (Insert Text)  Description: Initial Occupational Therapy Goals (11/29/2021) Within 7 day(s):    1. Patient will perform grooming standing sinkside with supervision for increased independence with ADLs. 2. Patient will perform LB dressing with supervision & A/E PRN for increased independence with ADLs. 3. Patient will perform toilet transfer with supervision for increased independence with ADLs. 4. Patient will perform all aspects of toileting with supervision for increased independence with ADLs. 5. Patient will independently apply energy conservation techniques with 1 verbal cue(s)for increased independence with ADLs. 6. Patient will perform bathroom mobility with supervision for increased independence/safety with ADLs. Outcome: Progressing Towards Goal  OCCUPATIONAL THERAPY EVALUATION    Patient: Efren Bennett (78 y.o. female)  Date: 11/29/2021  Primary Diagnosis: Primary localized osteoarthritis of right knee [M17.11]  Procedure(s) (LRB):  RIGHT TOTAL KNEE REPLACEMENT (Right) Day of Surgery   Precautions:  Fall, WBAT  PLOF: pt mod I for ADLs/functional mobility    ASSESSMENT AND RECOMMENDATIONS:  Based on the objective data described below, the patient presents with RLE decreased ROM and strength affecting LE ADLs. Pt found seated in recliner chair, vitals assessed and WNL, pt reporting pain 2/10, agreeable to therapy. Educated pt on proper body mechanics for ADLs s/p TKR. Pt completed upper body dressing with supervision seated in chair. Pt able to thread B feet through underwear/skirt without assist, and CGA when standing to pull up to waist. Pt CGA/SBA for STS/bathroom mobility with vc for safe use of RW. Pt ambulated back to recliner, ice applied to R knee. Spouse present during session for education on home safety. Provided opportunity for pt to voice questions on ADL performance when home, pt has no further concerns. Patient will benefit from skilled Occupational Therapy intervention to maximize safety/independence with ADLs at d/c.    Education: Reviewed home safety, body mechanics, importance of moving every hour to prevent joint stiffness, role of ice for edema/pain control, Rolling Walker management/safety, and adaptive dressing techniques with patient verbalizing  understanding at this time     Patient will benefit from skilled intervention to address the above impairments. Patient's rehabilitation potential is considered to be Good  Factors which may influence rehabilitation potential include:   [x]             None noted  []             Mental ability/status  []             Medical condition  []             Home/family situation and support systems  []             Safety awareness  []             Pain tolerance/management  []             Other:        PLAN :  Recommendations and Planned Interventions:   [x]               Self Care Training                  [x]      Therapeutic Activities  [x]               Functional Mobility Training   []      Cognitive Retraining  []               Therapeutic Exercises           []      Endurance Activities  []               Balance Training                    []      Neuromuscular Re-Education  []               Visual/Perceptual Training     [x]      Home Safety Training  [x]               Patient Education                   [x]      Family Training/Education  []               Other (comment):    Frequency/Duration: Patient will be followed by Occupational Therapy 1-2 times per day/4-7 days per week to address goals. Discharge Recommendations: Home health with adult supervision at least 24 hours after d/c  Further Equipment Recommendations for Discharge: N/A     SUBJECTIVE:   Patient stated i'm feeling alright.     OBJECTIVE DATA SUMMARY:     Past Medical History:   Diagnosis Date    Adverse effect of anesthesia     BLOOD PRESSURE DROPS    Adverse effect of anesthesia     5/10/18-pt states \"last 2 surgeries my blood pressure bottomed out\"    Atypical ductal hyperplasia of left breast     Atypical lobular hyperplasia of right breast     Cardiac arrhythmia     Colon polyps     Esophageal reflux     Exercise tolerance finding 05/10/2018     2 flights of stairs-no chest pain and no SOB     GERD (gastroesophageal reflux disease)     diet controlled    Heart murmur     Hypertension     Hypertension     Mixed incontinence urge and stress (male)(female)     Nausea & vomiting     Nocturia     OAB (overactive bladder)     Osteopenia     H/O--LAST TEST RESULTS WNL    Primary localized osteoarthritis of right knee 11/21/2021    Right bundle branch block     Sleep apnea     uses a c-pap    Stress incontinence      Past Surgical History:   Procedure Laterality Date    COLONOSCOPY N/A 9/12/2018    COLONOSCOPY performed by Franco Griffith MD at [address] [place] [address] ENDOSCOPY    [facility]  2001    left    HX BLADDER SUSPENSION      HX BREAST REDUCTION Bilateral 10/19/2016    bilateral inferior pedicle BREAST REDUCTION performed by Teresa Pedraza MD at [address] [place] [address] MAIN OR    HX COLONOSCOPY      HX GI      EGD AND COLONSCOPY    HX HYSTERECTOMY      HX KNEE ARTHROSCOPY  2011    Right- meniscus repair    HX OOPHORECTOMY      HX ORTHOPAEDIC Bilateral 2013    CTR    HX UROLOGICAL      bladder suspension    US GUIDED CORE BREAST BIOPSY      right  benign     Barriers to Learning/Limitations: yes;  physical and other (post-anesthesia)  Compensate with: visual, verbal, tactile, kinesthetic cues/model    Home Situation/Prior Level of Function:   Home Situation  Home Environment: Private residence  # Steps to Enter: 2  Rails to Enter: No  One/Two Story Residence: One story  Lift Chair Available: No  Living Alone: No  Support Systems: Spouse/Significant Other  Patient Expects to be Discharged to[de-identified] House  Current DME Used/Available at Home: Stephie Stallings, herminio, Walker, rolling, Shower chair  Tub or Shower Type: Shower  []  Right hand dominant   []  Left hand dominant    Cognitive/Behavioral Status:  Neurologic State: Alert  Orientation Level: Oriented to person; Oriented to place; Oriented to situation  Cognition: Follows commands  Safety/Judgement: Awareness of environment    Skin: R knee incision w/ Mepilex   Edema: compression hose in place & applied ice     Coordination: BUE  Coordination: Within functional limits  Fine Motor Skills-Upper: Left Intact; Right Intact    Gross Motor Skills-Upper: Left Intact; Right Intact    Balance:  Sitting: Intact  Standing: Intact; With support    Strength: BUE  Strength: Generally decreased, functional    Tone & Sensation:BUE  Tone: Normal  Sensation: Impaired (R knee)    Range of Motion: BUE  AROM: Generally decreased, functional    Functional Mobility and Transfers for ADLs:  Bed Mobility:  Scooting: Stand-by assistance  Transfers:  Sit to Stand: Contact guard assistance; Stand-by assistance (vc)    ADL Assessment:  Feeding: Independent  Oral Facial Hygiene/Grooming: Stand-by assistance  Bathing: Contact guard assistance  Upper Body Dressing: Supervision  Lower Body Dressing: Minimum assistance  Toileting: Contact guard assistance    ADL Intervention:  Upper Body Dressing Assistance  Dressing Assistance: Supervision  Bra: Supervision  Pullover Shirt: Supervision    Lower Body Dressing Assistance  Dressing Assistance: Contact guard assistance  Underpants: Contact guard assistance  Pants With Elastic Waist: Contact guard assistance (skirt)  Leg Crossed Method Used: No  Position Performed: Seated in chair  Cues: Verbal cues provided; Visual cues provided    Cognitive Retraining  Safety/Judgement: Awareness of environment    Pain:  Pain level pre-treatment: 2/10  Pain level post-treatment: 2/10  Pain Intervention(s): Medication administer by Nursing (see MAR); Rest, Ice, Repositioning   Response to intervention: Nurse notified, see doc flow     Activity Tolerance:   Fair.  Patient able to stand ~5 minute(s). Patient able to complete ADLs with intermittent rest breaks. Patient limited by pain, strength, ROM. Patient unsteady. Please refer to the flowsheet for vital signs taken during this treatment. After treatment:   [x]  Patient left in no apparent distress sitting up in chair  []  Patient sitting on EOB  []  Patient left in no apparent distress in bed  [x]  Call bell left within reach  [x]  Nursing notified  [x]  Caregiver present  [x]  Ice applied  []  SCD's on while back in bed  [] Bed alarm activated    COMMUNICATION/EDUCATION:   Communication/Collaboration:  [x]       Role of Occupational Therapy in the acute care setting. [x]      Home safety education was provided and the patient/caregiver indicated understanding. [x]      Patient/family have participated as able in goal setting and plan of care. [x]      Patient/family agree to work toward stated goals and plan of care. []      Patient understands intent and goals of therapy, but is neutral about his/her participation. []      Patient is unable to participate in plan of care at this time. Thank you for this referral.  Willie Jimenez OTR/L  Time Calculation: 25 mins    Eval Complexity: History: MEDIUM Complexity : Expanded review of history including physical, cognitive and psychosocial  history ; Examination: LOW Complexity : 1-3 performance deficits relating to physical, cognitive , or psychosocial skils that result in activity limitations and / or participation restrictions ;    Decision Making:LOW Complexity : No comorbidities that affect functional and no verbal or physical assistance needed to complete eval tasks

## 2021-12-01 ENCOUNTER — HOME CARE VISIT (OUTPATIENT)
Dept: HOME HEALTH SERVICES | Facility: HOME HEALTH | Age: 76
End: 2021-12-01
Payer: MEDICARE

## 2021-12-01 PROCEDURE — 3331090002 HH PPS REVENUE DEBIT

## 2021-12-01 PROCEDURE — 3331090001 HH PPS REVENUE CREDIT

## 2021-12-01 NOTE — HOME HEALTH
PMHx: List of Comorbitites:          Atypical ductal hyperplasia of left breast      Atypical lobular hyperplasia of right breast      Cardiac arrhythmia      Colon polyps      Esophageal reflux      GERD (gastroesophageal reflux disease)      Heart murmur      Hypertension      Hypertension      Mixed incontinence urge and stress (male)(female)      Nausea & vomiting      Nocturia      OAB (overactive bladder)     Osteopenia      Primary localized osteoarthritis of right knee 11/21/2021    Right bundle branch block      Stress incontinence       SUBJECTIVE: I am doing ok today. I did not get home until late last night   LIVING SITUATION: Pt lives in a multilevel clutter home with . Pt bedroom and bathroom are on the first floor. there are 2 steps at side entrence with 1 HR   REQUIRES CAREGIVER ASSISTANCE FOR: transportation, medications, ADLS, IADLS   PLOF: Pt was I with amb without A DEV. Pt was I with dressing and bathing   MEDICATIONS REVIEWED AND RECONCILED  Medications reconciled and all medications are available in the home this visit. The following education was provided regarding medications, medication interactions, and look a like medications. Medications are effective at this time. no severe interactions noted. Educted pt to take oxycodone as prescribed   NEXT MD APPT: Dr. Artur Mcclendon   ROM: R knee AROM in sitting -5-90 degrees   STRENGTH: R hip flexors -3/5 R quads and hamstrings 3/5 DF/PF 5/5  L hip flexors, quads and hamstrings, DF/PF 5/5   WOUNDS:  R knee bandges is clean dry and intact. No drainage noted. Next dressing change is 12/6/21. Per MD orders Change Mepilex dressing on day that is written on dressing. Next dressing change should be within 3-5 days. However, if patient is making follow-up visit with Dr. Artur Mcclendon the day after the dressing is due, leave dressing on.   If drainage noted, change PRN  BED MOBILITY:supine to sit and sit to supine with CGA with MOD vc needed to use contra lateral leg assistance to assit with R LE managment   TRANSFERS: sit to stand from chair with arms with RW with SBA, form bed with SBA and from commode with SBA vc needed for handplacment on all transfers and pt extends R LE fwd on all transfers   GAIT: Pt amb 20 ft x 2 with RW with step through antalgic gait pattern with decresased B step length decreased R knee flexion during swing phase of gait decreased R HS at intial contact with a slightly fwd flexed posture with SBA with MIN vc needed to look ahead when amb   STAIRS:NA  BALANCE: Pt scored 15/28 on Tinetti Balance Assessment placing pt at high  risk for falls. PATIENT EDUCATION PROVIDED THIS VISIT: safety, HEP, walking, deep breathing, Educated pt to use RW at all times when amb. Educated pt to change position every HR for pressure relief. Educated pt elevate R LE throughout the day. Ice to R LE 20 min on 1 HR off throughout the day for pain relief and to contol swelling. Adjusted RW to the proper height and turned the wheels inward so it would fit through doorways      HEP consisting of:  1. Walking every hour during the day with RW  2. supine therex R LE AP,QS,HS,SAQ, SLR hamstrings sets, seated LAQS, kneee flexion 3 daily x 10  Written HEP issued, patient/caregiver verbalized understanding. CONTINUED NEED FOR THE FOLLOWING SKILLS: HH PT is medically necessary to  address pain, decreased ROM, decreased strength, increased swelling, impaired bed mobility, decreased independence with functional transfers, impaired gait, impaired stair negotiation, and impaired balance in order to improve functional independence, quality of life, return to PLOF, and reduce the risk for falls. ASSESSMENT: Pt presents with decreased strength and AROM R LE. A requied with all transfers and bed mobility skills, ans stairs.  Pt is amb limited distances with RW with A. Pt presents with decreased dynamic standing balance   PLAN:   Pt will be seen to establish and upgrade HEP. Gait training with the least restrictive A DEV on flat and uneven surfaces. Bed mobility training, transfer training. Stair training. Dynamic standing balance re -education. Reinforece general safety precautions. DISCHARGE PLANNING DISCUSSED: Discharge to self and family under MD supervision once all goals have been met or patient has reached max potential. Patient/caregiver verbalized understanding.  office notified that PT Admit completed 11/30/21. Medications reconciled and all medications are available in the home this visit. The following education was provided regarding medications, medication interactions, and look a like medications. Medications are effective at this time. no severe interactions noted. Educted pt to take oxycodone as prescribed. Pt presents with decreased strength and AROM R LE. A requied with all transfers and bed mobility skills, ans stairs. Pt is amb limited distances with RW with A. Pt presents with decreased dynamic standing balance. Pt will be seen to establish and upgrade HEP. Gait training with the least restrictive A DEV on flat and uneven surfaces. Bed mobility training, transfer training. Stair training. Dynamic standing balance re -education. Reinforece general safety precautions.

## 2021-12-02 ENCOUNTER — HOME CARE VISIT (OUTPATIENT)
Dept: SCHEDULING | Facility: HOME HEALTH | Age: 76
End: 2021-12-02
Payer: MEDICARE

## 2021-12-02 PROCEDURE — G0157 HHC PT ASSISTANT EA 15: HCPCS

## 2021-12-02 PROCEDURE — 3331090002 HH PPS REVENUE DEBIT

## 2021-12-02 PROCEDURE — 3331090001 HH PPS REVENUE CREDIT

## 2021-12-03 ENCOUNTER — HOME CARE VISIT (OUTPATIENT)
Dept: SCHEDULING | Facility: HOME HEALTH | Age: 76
End: 2021-12-03
Payer: MEDICARE

## 2021-12-03 PROCEDURE — G0157 HHC PT ASSISTANT EA 15: HCPCS

## 2021-12-03 PROCEDURE — 3331090002 HH PPS REVENUE DEBIT

## 2021-12-03 PROCEDURE — 3331090001 HH PPS REVENUE CREDIT

## 2021-12-04 PROCEDURE — 3331090001 HH PPS REVENUE CREDIT

## 2021-12-04 PROCEDURE — 3331090002 HH PPS REVENUE DEBIT

## 2021-12-05 PROCEDURE — 3331090001 HH PPS REVENUE CREDIT

## 2021-12-05 PROCEDURE — 3331090002 HH PPS REVENUE DEBIT

## 2021-12-06 VITALS
DIASTOLIC BLOOD PRESSURE: 78 MMHG | SYSTOLIC BLOOD PRESSURE: 134 MMHG | OXYGEN SATURATION: 96 % | HEART RATE: 78 BPM | TEMPERATURE: 98.1 F

## 2021-12-06 PROCEDURE — 3331090002 HH PPS REVENUE DEBIT

## 2021-12-06 PROCEDURE — 3331090001 HH PPS REVENUE CREDIT

## 2021-12-06 NOTE — HOME HEALTH
Subjective: Pt reports that she is familiar with the importance of being regular. She is a former nurse. Future MD appointments: 12/15/21 at 12:15pm with Emma Dale MD  Caregiver involvement/assistance needed for: Pt lives spouse in single level home. He helps with errands and transportation. Objective: See interventions. Assessment of Progress toward goals: Pt is MOD I with most transfers and amb with good stride. Continued need for the following skills: There is a continued need for skilled PT to address strength, gait and AROM of R knee. Communications with all co-treating staff regarding pt status/progress during staff meeting/phone call/text/email. Equipment Needs:  Plan for Next visit: Address stength exercises and assess AROM next visit. Frequency: 3 w 2, 2 w 1. Pt has one remaining visit this week. The Following Discharge Planning was Discussed with the Pt/Caregiver: Pt to receive Home Health PT until goals are met or until max benefits have been achieved.

## 2021-12-06 NOTE — HOME HEALTH
Subjective: Pt is not wearing compression garments and reports that she can not get them on. Pt asks if she needs to use FWW. Pt instructed to still use it to allow her knee to heal and pt acknowledges her understanding. Future MD appointments: 12/15/21 with Raeann Dang MD  Caregiver involvement/assistance needed for: Pt lives with spouse who helps with errands and transportation  Objective: See interventions. Assessment of Progress toward goals: Pt is progressing with goals of improved strength, transfer training and AROM of R knee to amb aound the home and perform ADLs. Continued need for the following skills: There is a contunued need for skilled PT to improve strength, AROM and balance to get out into the community. Equipment Needs:  Plan for Next visit: Continue to assess AROM, strength, gait and stair training. Frequency: 3 w 2, 2 w 1. Pt has 3 visits next week. The Following Discharge Planning was Discussed with the Pt/Caregiver: Pt to receive Home Health PT until goals are met or until max benefit has been achieved.

## 2021-12-07 ENCOUNTER — HOME CARE VISIT (OUTPATIENT)
Dept: SCHEDULING | Facility: HOME HEALTH | Age: 76
End: 2021-12-07
Payer: MEDICARE

## 2021-12-07 PROCEDURE — G0157 HHC PT ASSISTANT EA 15: HCPCS

## 2021-12-07 PROCEDURE — 3331090001 HH PPS REVENUE CREDIT

## 2021-12-07 PROCEDURE — 3331090002 HH PPS REVENUE DEBIT

## 2021-12-08 PROCEDURE — 3331090002 HH PPS REVENUE DEBIT

## 2021-12-08 PROCEDURE — 3331090001 HH PPS REVENUE CREDIT

## 2021-12-09 ENCOUNTER — HOME CARE VISIT (OUTPATIENT)
Dept: SCHEDULING | Facility: HOME HEALTH | Age: 76
End: 2021-12-09
Payer: MEDICARE

## 2021-12-09 VITALS
OXYGEN SATURATION: 98 % | DIASTOLIC BLOOD PRESSURE: 78 MMHG | TEMPERATURE: 98.3 F | SYSTOLIC BLOOD PRESSURE: 138 MMHG | HEART RATE: 74 BPM

## 2021-12-09 PROCEDURE — 3331090002 HH PPS REVENUE DEBIT

## 2021-12-09 PROCEDURE — G0157 HHC PT ASSISTANT EA 15: HCPCS

## 2021-12-09 PROCEDURE — 3331090001 HH PPS REVENUE CREDIT

## 2021-12-09 NOTE — HOME HEALTH
Subjective: Pt reports that she thinks she has not worked as hard as she could. Pt reports some pain in her R knee. Future MD appointments: 12/15/21 1pm? with Ernestine Mendoza. Caregiver involvement/assistance needed for: Pt lives in home with spouse who helps with errands and transportation. Objective: See interventions. Assessment of Progress toward goals: Pt is progress with gait and is now using SPC to amb household distances. Pt is progressing with AROM, as well. Continued need for the following skills: There is a need for skilled PT to address balance, gait and stair training. Communications with all co-treating staff regarding pt status/progress during staff meeting. Equipment Needs:  Plan for Next visit: Address balance, gait training and stair training to reduce the risk of falls. Frequency: 3 w 2, 2 w 1. Pt has 2 remaining visit. The Following Discharge Planning was Discussed with the Pt/Caregiver: Pt to receive Home Health PT until goals are met or max benefit is achieved.

## 2021-12-10 ENCOUNTER — HOME CARE VISIT (OUTPATIENT)
Dept: SCHEDULING | Facility: HOME HEALTH | Age: 76
End: 2021-12-10
Payer: MEDICARE

## 2021-12-10 VITALS
OXYGEN SATURATION: 97 % | TEMPERATURE: 98.1 F | SYSTOLIC BLOOD PRESSURE: 139 MMHG | TEMPERATURE: 98.4 F | SYSTOLIC BLOOD PRESSURE: 124 MMHG | OXYGEN SATURATION: 99 % | DIASTOLIC BLOOD PRESSURE: 62 MMHG | DIASTOLIC BLOOD PRESSURE: 76 MMHG | HEART RATE: 95 BPM | HEART RATE: 83 BPM

## 2021-12-10 PROCEDURE — G0157 HHC PT ASSISTANT EA 15: HCPCS

## 2021-12-10 PROCEDURE — 3331090001 HH PPS REVENUE CREDIT

## 2021-12-10 PROCEDURE — 3331090002 HH PPS REVENUE DEBIT

## 2021-12-11 PROCEDURE — 3331090001 HH PPS REVENUE CREDIT

## 2021-12-11 PROCEDURE — 3331090002 HH PPS REVENUE DEBIT

## 2021-12-11 NOTE — HOME HEALTH
Subjective: Pt reports that every time she starts on the bike the pain in her R knee is an 8/10. It eases the more time she spends on it. Pt reports that her prescription for Mobic has been used up. Pt asks, \"Have I told you that I have mild essential tremors. It wasn't sure if I had told you. \" Pt reports that she noticed it about 6 years ago and she was finally diagnosed 3 years ago. Future MD appointments: 12/15/21 with Gerhardt Credit, MD.  Caregiver involvement/assistance needed for: Pt lives with spouse on the first floor of a two story home. Objective: See interventions. Assessment of Progress toward goals: Pt still lacks R knee EXT, which limits pt's ability to perform proper heel strike. Pt has improved her Tinetti score from 15 to 27, reducing her risk for falls. Continued need for the following skills: The is a continued need for skilled PT to address R knee EXT gait and stair training. Equipment Needs:  Plan for Next visit: Contine to address gait/stair training and address R knee EXT next visit. Frequency: 3 w 2, 2 w 1. Pt has two remaining visits next week. The Following Discharge Planning was Discussed with the Pt/Caregiver: Pt to receive Home Health PT until goals are met or pt has achieved max benefit.  Pt will then transfer to Outpt PT.

## 2021-12-11 NOTE — HOME HEALTH
Subjective: Pt reports that she is very stiff the very first time she goes around on the bike. It loosens up the longer she pedals. Future MD appointments: 12/15/21 around 1pm with Jabari Garcias MD  Caregiver involvement/assistance needed for: Pt lives in the first floor of their home. Her  helps with errands and transportation. Objective: See interventions. Assessment of Progress toward goals: Pt still lacks R knee EXT which shortens her stride slightly. Pt will need to focus on R knee extension. Continued need for the following skills: There is continued need for skilled PT to address AROM, strength and balance to reduce the risk of falls. Communications with all co-treating staff regarding pt status/progress during staff meeting and impending DC next week. Equipment Needs:  Plan for Next visit: Pt to receive therapy based assessment next visit to determine progress toward goals. Frequency: 3 w 2, 2 w 1. Pt has one remaining visit this week. The Following Discharge Planning was Discussed with the Pt/Caregiver: Pt to receive Home Health PT until goals are met of max benefit has been achieved.

## 2021-12-12 PROCEDURE — 3331090002 HH PPS REVENUE DEBIT

## 2021-12-12 PROCEDURE — 3331090001 HH PPS REVENUE CREDIT

## 2021-12-13 ENCOUNTER — HOME CARE VISIT (OUTPATIENT)
Dept: SCHEDULING | Facility: HOME HEALTH | Age: 76
End: 2021-12-13
Payer: MEDICARE

## 2021-12-13 PROCEDURE — 3331090001 HH PPS REVENUE CREDIT

## 2021-12-13 PROCEDURE — G0157 HHC PT ASSISTANT EA 15: HCPCS

## 2021-12-13 PROCEDURE — 3331090002 HH PPS REVENUE DEBIT

## 2021-12-14 ENCOUNTER — TRANSCRIBE ORDER (OUTPATIENT)
Dept: SCHEDULING | Age: 76
End: 2021-12-14

## 2021-12-14 VITALS
DIASTOLIC BLOOD PRESSURE: 71 MMHG | TEMPERATURE: 98.2 F | HEART RATE: 88 BPM | SYSTOLIC BLOOD PRESSURE: 128 MMHG | OXYGEN SATURATION: 98 %

## 2021-12-14 DIAGNOSIS — N60.99 ATYPICAL DUCTAL HYPERPLASIA OF BREAST: Primary | ICD-10-CM

## 2021-12-14 PROCEDURE — 3331090002 HH PPS REVENUE DEBIT

## 2021-12-14 PROCEDURE — 3331090001 HH PPS REVENUE CREDIT

## 2021-12-14 NOTE — HOME HEALTH
Subjective: Pt reports that she has been walking outside. Inside she walks without any AD. Outside she walks with her SPC. Pt states that she has tried step up with her RLE. Future MD appointments: 12/15/21 at 1pm with [facility]  Caregiver involvement/assistance needed for: Pt lives on the first floor of a two story home. Objective: See interventions. Assessment of Progress toward goals: Pt is progressing with AROM and strength to get out into the community. Continued need for the following skills: There is continued need for skilled PT to assess pt's progress toward goals for impending DC. Communications with [facility] regarding pt progress via staff meeting. Equipment Needs:  Plan for Next visit: Pt to receive therapy based assessment for impending DC. Frequency: 3 w 2, 2 w 1. Pt has one remaining visit this week. The Following Discharge Planning was Discussed with the Pt/Caregiver: Pt to receive Home Health PT until goals are met or pt has achieved max benefit. She will transition to the Outpt PT of her choice.

## 2021-12-15 PROCEDURE — 3331090002 HH PPS REVENUE DEBIT

## 2021-12-15 PROCEDURE — 3331090001 HH PPS REVENUE CREDIT

## 2021-12-16 ENCOUNTER — HOME CARE VISIT (OUTPATIENT)
Dept: SCHEDULING | Facility: HOME HEALTH | Age: 76
End: 2021-12-16
Payer: MEDICARE

## 2021-12-16 VITALS
TEMPERATURE: 97.7 F | RESPIRATION RATE: 12 BRPM | SYSTOLIC BLOOD PRESSURE: 149 MMHG | OXYGEN SATURATION: 97 % | DIASTOLIC BLOOD PRESSURE: 86 MMHG | HEART RATE: 79 BPM

## 2021-12-16 PROCEDURE — G0151 HHCP-SERV OF PT,EA 15 MIN: HCPCS

## 2021-12-16 PROCEDURE — 3331090001 HH PPS REVENUE CREDIT

## 2021-12-16 PROCEDURE — 3331090002 HH PPS REVENUE DEBIT

## 2021-12-16 NOTE — Clinical Note
Patient is s/p  R TKR and has been treated for ROM, strengthening, gait training, stair training, HEP training, safety training, and balance training. On [facility]  11/30/21 AROM was R knee AROM in sitting -5-90 degrees. Today at [place] AROM is  R knee in sitting -5- 120 degrees. Pt made progres but did not met goal of 0 extension. On SOC strength was R hip flexors -3/5 R quads and hamstrings 3/5 DF/PF 5/5  L hip flexors, quads and hamstrings, DF/PF 5/5 . Today at [place] strength is R hip flexors 5/5 R quads and hamstrings +4/5 . On SOC bed mobility was supine to sit and sit to supine with CGA with MOD vc needed to use contra lateral leg assistance to assit with R LE managmentt. Today at [place] bed mobility is supine to sit and sit to supine MOD I. On [facility] transfers were sit to stand from chair with arms with RW with SBA, form bed with SBA and from commode with SBA vc needed for handplacment on all transfers and pt extends R LE fwd on all transfers . Today at [place] transfers are sit to stand from chair and bed with and without B UE support MOD I . On SOC gait was Pt amb 20 ft x 2 with RW with step through antalgic gait pattern with decresased B step length decreased R knee flexion during swing phase of gait decreased R HS at intial contact with a slightly fwd flexed posture with SBA with MIN vc needed to look ahead when amb . Today at [place] gait is Pt amb household distances without A DEV with reciprocal gait pattern MOD I. no balance checks notes with amb and tila and B step length are normal..  Per PTA visit 12/9/21 Pt amb 2 x 300ft with SPC SBA uneven surfaces outside. Pt amb with good stride and good R knee FLEX/EXT during swing phase. Pt is able to carry out a conversation while amb, showing that she is working at an optimal level. On [facility] stairs were not assessed. . Today on [place] stairs are Pt went up and down 2 steps with U UE support on gait MOD I with step too gait pattern .  On [facility] Pt scored 15/28 on Tinetti Balance Assessment placing pt at high  risk for falls. Today at [place] pt scored a 28/28 on Tinetti Balance Assessment placing pt as a low  fall risk. Pt did have more than a 4 point statisitcial improvement. Pt has made progress and has met all goals. Discharge plan: Discharge from [address] [place] [address] services as all goals have been met. Dr. Mathieu Lawrence office notifed of DC from [facility] AT [facility] with goal met.  Pt will follow up with outpatient therapy of her choice

## 2021-12-16 NOTE — HOME HEALTH
SUBJECTIVE: I went to Dr Papito Li office yesteday. They said that everything was going well and gave me a scirpt for outpatient therapy   REQUIRES CAREGIVER ASSISTANCE FOR: transportation, Jay Gorman 63: no changes   NEXT MD APPT: Dr Papito Li 2/2/22  ROM: R knee in sitting -5- 120 degrees  STRENGTH: R hip flexors 5/5 R quads and hamstrings +4/5   WOUNDS: R knee incision open to air. no drainge noted. Edges are approximated   BED MOBILITY:supine to sit and sit to supine MOD I  TRANSFERS:sit to stand from chair and bed with and without B UE support MOD I   GAIT: Pt amb household distances without A DEV with reciprocal gait pattern MOD I. no balance checks notes with amb and tila and B step length are normal..  Per PTA visit 12/9/21 Pt amb 2 x 300ft with SPC SBA uneven surfaces outside. Pt amb with good stride and good R knee FLEX/EXT during swing phase. Pt is able to carry out a conversation while amb, showing that she is working at an optimal level. STAIRS:Pt went up and down 2 steps with U UE support on gait MOD I with step too gait pattern   BALANCE: Pt scored 28/28 on Tinetti Balance Assessment placing pt at low risk for falls. PATIENT RESPONE TO TX: Pt had no increase in pain with mobility today   PATIENT LEVEL OF UNDERSTANDING OF EDUCATION PROVIDED: Pt was able to teach back the use of SPC when amb in the community    PATIENT EDUCATION PROVIDED THIS VISIT: safety, HEP, walking, deep breathing, Cont to use SPC at all times when amb in public for safety    HEP consisting of:  1. Walking every hour during the day with SPC   2. supine therex R LE AP,QS,HS,SAQ, SLR hamstrings sets, seated LAQS, kneee flexion 3 daily x 10  Written HEP issued, patient/caregiver verbalized understanding. Patient is s/p  R TKR and has been treated for ROM, strengthening, gait training, stair training, HEP training, safety training, and balance training.  On [facility]  11/30/21 AROM was R knee AROM in sitting -5-90 degrees. Today at [place] AROM is  R knee in sitting -5- 120 degrees. Pt made progres but did not met goal of 0 extension. On SOC strength was R hip flexors -3/5 R quads and hamstrings 3/5 DF/PF 5/5  L hip flexors, quads and hamstrings, DF/PF 5/5 . Today at [place] strength is R hip flexors 5/5 R quads and hamstrings +4/5 . On SOC bed mobility was supine to sit and sit to supine with CGA with MOD vc needed to use contra lateral leg assistance to assit with R LE managmentt. Today at [place] bed mobility is supine to sit and sit to supine MOD I. On [facility] transfers were sit to stand from chair with arms with RW with SBA, form bed with SBA and from commode with SBA vc needed for handplacment on all transfers and pt extends R LE fwd on all transfers . Today at [place] transfers are sit to stand from chair and bed with and without B UE support MOD I . On SOC gait was Pt amb 20 ft x 2 with RW with step through antalgic gait pattern with decresased B step length decreased R knee flexion during swing phase of gait decreased R HS at intial contact with a slightly fwd flexed posture with SBA with MIN vc needed to look ahead when amb . Today at [place] gait is Pt amb household distances without A DEV with reciprocal gait pattern MOD I. no balance checks notes with amb and tila and B step length are normal..  Per PTA visit 12/9/21 Pt amb 2 x 300ft with SPC SBA uneven surfaces outside. Pt amb with good stride and good R knee FLEX/EXT during swing phase. Pt is able to carry out a conversation while amb, showing that she is working at an optimal level. On [facility] stairs were not assessed. . Today on [place] stairs are Pt went up and down 2 steps with U UE support on gait MOD I with step too gait pattern . On [facility] Pt scored 15/28 on Tinetti Balance Assessment placing pt at high  risk for falls. Today at [place] pt scored a 28/28 on Tinetti Balance Assessment placing pt as a low  fall risk. Pt did have more than a 4 point statisitcial improvement. Pt has made progress and has met all goals. Discharge plan: Discharge from [facility] services as all goals have been met. Dr. Izaguirre Flight office notifed of DC from [facility] AT [facility] with goal met.  Pt will follow up with outpatient therapy of her choice

## 2021-12-22 ENCOUNTER — HOSPITAL ENCOUNTER (OUTPATIENT)
Dept: PHYSICAL THERAPY | Age: 76
Discharge: HOME | End: 2021-12-22
Payer: MEDICARE

## 2021-12-22 PROCEDURE — 97161 PT EVAL LOW COMPLEX 20 MIN: CPT

## 2021-12-22 PROCEDURE — 97110 THERAPEUTIC EXERCISES: CPT

## 2021-12-22 NOTE — PROGRESS NOTES
[address] [facility] PHYSICAL THERAPY  [address] [facility] [address], [address] - Phone: [phone]  Fax: [phone] / [address] [address] PHYSICAL THERAPY SERVICES  Patient Name: Eddie Cruz : 1945   Medical   Diagnosis: Right knee pain [M25.561] Treatment Diagnosis: Right TKA   Onset Date: DOS: 2021     Referral Source: Miguelina Lowe MD [facility]): 2021   Prior Hospitalization: See medical history Provider #: [id number]   Prior Level of Function: Ind c ADL's   Comorbidities: Heart Disease, OA, HTN, left ankle surgery, B carpal tunnel syndrome   Medications: Verified on Patient Summary List   The Plan of Care and following information is based on the information from the initial evaluation.   ==========================================================================================  Assessment / key information:  Pt is a 78year old female who presents to PT today after R TKA performed on 2021. Reports right knee pain was chronic prior to surgery. Completed HHPT afterwards. The resulting condition has affected their gait stability without AD and ability to negotiate stairs . Patient with a Functional Status score of 53 on FOTO (Focused on Therapeutic Outcomes), which corresponds to a functional limitation of 47%. Patient will benefit from skilled PT services to address these issues.     Gait:  Independent with SPC                                                                                   AROM                      PROM                   Strength (1-5)      Left Right Left Right Left Right   Hip Flexion (0-120)  NT 90  NT  NT   5  4+   Knee Flexion (0-135)  105 86  NT   91  5  4+     Extension (0)  0  7  NT 5   5  4+   Ankle Plantarflexion (0-50)  WFL WFL   NT NT  5   5     Dorsiflexion (0-20)  WFL  WFL  NT  NT  5 4     notes: no SLR lag     Flexibility  Hamstrings(90/90 Test):                         (L) Tightness= []? WNL   []? Min   [x]? Mod   []? Severe                             (R) Tightness= []? WNL   []? Min   [x]? Mod   []? Severe     Outcome Measures:  5 Times Sit to Stand: 14\"     Pt was provided a HEP today and educated regarding their diagnosis and prognosis. Thank you for this referral.   ==========================================================================================  Eval Complexity: History: HIGH Complexity :3+ comorbidities / personal factors will impact the outcome/ POC Exam:MEDIUM Complexity : 3 Standardized tests and measures addressing body structure, function, activity limitation and / or participation in recreation  Presentation: MEDIUM Complexity : Evolving with changing characteristics  Clinical Decision Making:MEDIUM Complexity : FOTO score of 26-74Overall Complexity:LOW     Problem List: pain affecting function, decrease ROM, decrease strength, edema affecting function, impaired gait/ balance, decrease ADL/ functional abilitiies, decrease activity tolerance, decrease flexibility/ joint mobility and decrease transfer abilities   Treatment Plan may include any combination of the following: Therapeutic exercise, Therapeutic activities, Neuromuscular re-education, Physical agent/modality, Gait/balance training, Manual therapy, Patient education, Self Care training, Functional mobility training and Stair training  Patient / Family readiness to learn indicated by: asking questions, trying to perform skills and interest  Persons(s) to be included in education: patient (P)  Barriers to Learning/Limitations: None  Patient Goal (s): \"like to get completed ROM with minimal pain\"   Patient self reported health status: good  Rehabilitation Potential: good   Short Term Goals: To be accomplished in  3  weeks:  1. Pt will be compliant with HEP for symptom management at home. 2. Pt will demonstrate knee extension AROM to at least 2 deg from neutral to improve stance phase.   3. Pt will demonstrate knee flexion AROM to at least 95 deg to improve gait training.  Long Term Goals: To be accomplished in  6  weeks:  1. Pt will be independent with HEP at D/C for self management. 2. Pt will demonstrate knee flexion PROM to at least 105 deg to improve stair negotiation. 3. Pt will report being able to ambulate for 1 mile to improve quality of life. 4. Pt will increase FOTO Functional Status score to 67 to decrease functional limitations. 5. Pt will demonstrate knee extension strength of at least 5/5 to engage in age appropriate activities. Frequency / Duration:   Patient to be seen  2-3  times per week for 6  weeks:  Patient / Caregiver education and instruction: provided education regarding diagnosis and prognosis as well as details regarding treatment plain. activity modification and exercises  Therapist Signature: Claude Pon, DPT Date: 19/22/3889   Certification Period: 12/22/2021 - 3/21/2022 Time: 11:11 AM   ===========================================================================================  I certify that the above Physical Therapy Services are being furnished while the patient is under my care. I agree with the treatment plan and certify that this therapy is necessary. Physician Signature:        Date:       Time:     Mary Harper MD  Please sign and return to [employer] or you may fax the signed copy to [phone]. Thank you.

## 2021-12-22 NOTE — PROGRESS NOTES
PHYSICAL THERAPY - DAILY TREATMENT NOTE    Patient Name: Roman Found        Date: 2021  : 1945   YES Patient  Verified  Visit #:   1     Insurance: Payor: Irene Todd / Plan: [place] MED[place]ARE PART [place] & B / Product Type: Medicare /      In time:  Out time:    Total Treatment Time: 45     Medicare/BCBS Time Tracking (below)   Total Timed Codes (min):  25 1:1 Treatment Time:  25     TREATMENT AREA =  Right TKA    SUBJECTIVE    Pain Level (on 0 to 10 scale):  3-4  / 10   Medication Changes/New allergies or changes in medical history, any new surgeries or procedures? NO    If yes, update Summary List   Subjective Functional Status/Changes:  []  No changes reported   CC: see POC  HPI:  Symptoms:  Pain rating (0-10): Today: 3-4/10                        Best: 0/10                        Worst: 8/10     PMH: see chart  Patient Goals: \"like to get completed ROM with minimal pain\"     OBJECTIVE     Physical Therapy Evaluation - Knee        OBJECTIVE    See POC    25 min Therapeutic Exercise:  [x]  See flow sheet   Rationale:      increase ROM and increase strength to improve the patients ability to negotiate various environments in a safe and effective manner. min Patient Education:  YES  Reviewed HEP   []  Progressed/Changed HEP based on: Other Objective/Functional Measures:    See Above     Post Treatment Pain Level (on 0 to 10) scale:   3  / 10     ASSESSMENT    Assessment/Changes in Function:     See POC    Justification for Eval Code Complexity:  Patient History (low 0, mod 1-2, high 3-4): High  Examination (low 1-2, mod 3+, high 4+): Mod (see above)  Clinical Presentation (low stable or uncomplicated, mod evolving or changing, high unstable or unpredictable):  Low  Clinical Decision Making (low , mod 26-74, high 1-25): FOTO = Low     []  See Progress Note/Recertification   Patient will continue to benefit from skilled PT services to analyze,, cue,, progress,, modify,, demonstrate,, instruct, and address, movement patterns,, therapeutic interventions,, postural abnormalities,, soft tissue restrictions,, ROM,, strength,, functional mobility,, body mechanics/ergonomics, and home and community integration, to attain remaining goals.    Progress toward goals / Updated goals:    See POC     PLAN    []  Upgrade activities as tolerated YES Continue plan of care   []  Discharge due to :    []  Other:      Therapist: Polo Sandoval DPT    Date: 12/22/2021 Time: 11:32 AM     Future Appointments   Date Time Provider Afsaneh Angeles   12/22/2021 11:45 AM [facility] [address]   2/8/2022  1:20 PM Luisa, Katie Ramos, NP [address] [facility]

## 2021-12-29 ENCOUNTER — APPOINTMENT (OUTPATIENT)
Dept: PHYSICAL THERAPY | Age: 76
End: 2021-12-29
Payer: MEDICARE

## 2022-01-03 ENCOUNTER — HOSPITAL ENCOUNTER (OUTPATIENT)
Dept: PHYSICAL THERAPY | Age: 77
Discharge: HOME | End: 2022-01-03
Payer: MEDICARE

## 2022-01-03 PROCEDURE — 97110 THERAPEUTIC EXERCISES: CPT

## 2022-01-03 PROCEDURE — 97112 NEUROMUSCULAR REEDUCATION: CPT

## 2022-01-03 NOTE — PROGRESS NOTES
PHYSICAL THERAPY - DAILY TREATMENT NOTE    Patient Name: Jes Hernandez        Date: 1/3/2022  : 1945   YES Patient  Verified  Visit #:   2     Insurance: Payor: José Luis Taveras / Plan: [place] MEDICARE PART A & B / Product Type: Medicare /    In time: 105 Out time: 150   Total Treatment Time: 45     Medicare/BCBS Time Tracking (below)   Total Timed Codes (min):  45 1:1 Treatment Time:  45     TREATMENT AREA =  Right knee pain [M25.561]    SUBJECTIVE    Pain Level (on 0 to 10 scale):  1-2  / 10   Medication Changes/New allergies or changes in medical history, any new surgeries or procedures? NO    If yes, update Summary List   Subjective Functional Status/Changes:  []  No changes reported     Pt reports that there was no adverse effects following her evaluation. Complains of swelling. OBJECTIVE  Therapeutic Procedures:  Min Procedure Specifics + Rationale   (37) [x] Therapeutic Exercise    See Flowsheet   Rationale: increase ROM and increase strength to improve the patients ability to participate in ADL's      8 [x] Neuromuscular Re-Education See Flow Sheet  Tandem c Head Turns 3 x 30\"  SLS c intermittent UEs 3 x 30\"  Hurdles  Rationale: To improve stability over various surfaces to decrease falls risk. Improve core control and posture control in various positions to maximize level of function. min Patient Education:  YES Reviewed HEP         Other Objective/Functional Measures:    See flowsheet for more details      Increased LLE c sit<>stands  94 deg AAROM flexion c [place]  Minimal apprehension  min VC and demos required throughout session for proper form and increased safety         Post Treatment Pain Level (on 0 to 10) scale:   1-2  / 10     ASSESSMENT    Assessment/Changes in Function:     Pt responded well to treatment session today without increases in pain.       []  See Progress Note/Recertification   Patient will continue to benefit from skilled PT services to analyze,, cue,, progress,, modify,, demonstrate,, instruct, and address, movement patterns,, therapeutic interventions,, postural abnormalities,, soft tissue restrictions,, ROM,, strength,, functional mobility,, body mechanics/ergonomics, and home and community integration, to attain remaining goals.    Progress toward goals / Updated goals:    1st session since initial evaluation, no notable progress yet     PLAN    [x]  Upgrade activities as tolerated YES Continue plan of care   []  Discharge due to :    []  Other:      Therapist: Haylie Field DPT    Date: 1/3/2022 Time: 1:03 PM     Future Appointments   Date Time Provider Afsaneh Angeles   1/3/2022  1:15 PM [facility]   1/5/2022  1:15 PM Ludie Gaines [facility]   1/7/2022 11:45 AM Marck Barragan [facility]   1/10/2022  2:45 PM Marck Barragan [facility]   1/12/2022  1:15 PM Marck Gaines [facility]   1/14/2022 11:45 AM Marck Barragan [facility]   1/17/2022  1:15 PM Marck Barragan [facility]   1/19/2022  1:15 PM Marck Barragan [facility]   1/24/2022  1:15 PM Marck [facility]   1/26/2022  1:15 PM Marck Gaines [facility]   1/31/2022  1:15 PM Marck Barragan [facility]   2/8/2022  1:20 PM [facility], NP [address]

## 2022-01-05 ENCOUNTER — HOSPITAL ENCOUNTER (OUTPATIENT)
Dept: PHYSICAL THERAPY | Age: 77
Discharge: HOME | End: 2022-01-05
Payer: MEDICARE

## 2022-01-05 PROCEDURE — 97112 NEUROMUSCULAR REEDUCATION: CPT

## 2022-01-05 PROCEDURE — 97110 THERAPEUTIC EXERCISES: CPT

## 2022-01-05 PROCEDURE — 97530 THERAPEUTIC ACTIVITIES: CPT

## 2022-01-05 NOTE — PROGRESS NOTES
PHYSICAL THERAPY - DAILY TREATMENT NOTE    Patient Name: Alethea Dyer        Date: 2022  : 1945   YES Patient  Verified  Visit #:   3     Insurance: Payor: Carlitos Castrejon / Plan: [place] MEDICARE PART A & B / Product Type: Medicare /      In time: 115 Out time: 155   Total Treatment Time: 40     Medicare/BCBS Time Tracking (below)   Total Timed Codes (min):  40 1:1 Treatment Time:  40     TREATMENT AREA =  Right knee pain [M25.561]    SUBJECTIVE    Pain Level (on 0 to 10 scale):  3  / 10   Medication Changes/New allergies or changes in medical history, any new surgeries or procedures? NO    If yes, update Summary List   Subjective Functional Status/Changes:  []  No changes reported     Pt reports that there was no adverse effect following last session            OBJECTIVE  Therapeutic Procedures:  Min Procedure Specifics + Rationale   22 [x] Therapeutic Exercise    See Flowsheet   Rationale: increase ROM and increase strength to improve the patients ability to participate in ADL's    10 [x]  Manual Therapy          Grade 2-3 Tibial IR/ER Mobilization with Extension Mob  Rationale: decrease pain, increase ROM, increase tissue extensibility, decrease trigger points and increase postural awareness to attain functional use and participation with ADL's    The manual therapy interventions were performed at a separate and distinct time from the therapeutic activities interventions. 8 [x] Neuromuscular Re-Education See Flow Sheet    Tandem c Head Turns 2 x 30\"  SLS c intermittent UEs 3 x 30\"      Rationale: To improve stability over various surfaces to decrease falls risk. Improve core control and posture control in various positions to maximize level of function.       min Patient Education:  YES Reviewed HEP         Other Objective/Functional Measures:    See flowsheet for more details  Added standing marches, quad sets and standing leg curls     VC and demos required throughout session for proper form and increased safety         Post Treatment Pain Level (on 0 to 10) scale:   0  / 10     ASSESSMENT    Assessment/Changes in Function:     Pt responding well to current program and tolerating sessions well.      []  See Progress Note/Recertification   Patient will continue to benefit from skilled PT services to analyze,, cue,, progress,, modify,, demonstrate,, instruct, and address, movement patterns,, therapeutic interventions,, postural abnormalities,, soft tissue restrictions,, ROM,, strength,, functional mobility,, body mechanics/ergonomics, and home and community integration, to attain remaining goals. Progress toward goals / Updated goals: · Short Term Goals: To be accomplished in  3  weeks:  1. Pt will be compliant with HEP for symptom management at home. 2. Pt will demonstrate knee extension AROM to at least 2 deg from neutral to improve stance phase. Utilized Manual  3. Pt will demonstrate knee flexion AROM to at least 95 deg to improve gait training. · Long Term Goals: To be accomplished in  6  weeks:  1. Pt will be independent with HEP at D/C for self management. 2. Pt will demonstrate knee flexion PROM to at least 105 deg to improve stair negotiation. 3. Pt will report being able to ambulate for 1 mile to improve quality of life. 4. Pt will increase FOTO Functional Status score to 67 to decrease functional limitations. 5. Pt will demonstrate knee extension strength of at least 5/5 to engage in age appropriate activities.       PLAN    [x]  Upgrade activities as tolerated YES Continue plan of care   []  Discharge due to :    []  Other:      Therapist: Lisy Pacheco DPT    Date: 1/5/2022 Time: 11:09 AM     Future Appointments   Date Time Provider Afsaneh Angeles   1/5/2022  1:15 PM [facility]   1/7/2022 11:45 AM [facility]   1/10/2022  2:45 PM [facility]   1/12/2022  1:15 PM Lois Crandall [facility]   1/14/2022 11:45 AM Yarelis Lung [facility]   1/17/2022  1:15 PM [facility]   1/19/2022  1:15 PM [facility]   1/24/2022  1:15 PM [facility]   1/26/2022  1:15 PM [facility]   1/31/2022  1:15 PM [facility]   2/8/2022  1:20 PM Luisa, Herman Eugene, NP [address] [facility]

## 2022-01-07 ENCOUNTER — HOSPITAL ENCOUNTER (OUTPATIENT)
Dept: PHYSICAL THERAPY | Age: 77
Discharge: HOME | End: 2022-01-07
Payer: MEDICARE

## 2022-01-07 PROCEDURE — 97110 THERAPEUTIC EXERCISES: CPT

## 2022-01-07 PROCEDURE — 97112 NEUROMUSCULAR REEDUCATION: CPT

## 2022-01-07 PROCEDURE — 97530 THERAPEUTIC ACTIVITIES: CPT

## 2022-01-07 NOTE — PROGRESS NOTES
PHYSICAL THERAPY - DAILY TREATMENT NOTE    Patient Name: Lazaro Jeffries        Date: 2022  : 1945   YES Patient  Verified  Visit #:   4     Insurance: Payor: Davis Lamar / Plan: [place] MEDICARE PART A & B / Product Type: Medicare /      In time: 7269 Out time: 45   Total Treatment Time: 40     Medicare/BCBS Time Tracking (below)   Total Timed Codes (min):  40 1:1 Treatment Time:  40     TREATMENT AREA =  Right knee pain [M25.561]    SUBJECTIVE    Pain Level (on 0 to 10 scale):  2  / 10   Medication Changes/New allergies or changes in medical history, any new surgeries or procedures? NO    If yes, update Summary List   Subjective Functional Status/Changes:  []  No changes reported     Pt reports increased pain and stiffness yesterday that resolved some today    Compliance with HEP  Yes [] No []         OBJECTIVE  Therapeutic Procedures:  Min Procedure Specifics + Rationale   20(20) [x] Therapeutic Exercise    See Flowsheet   Rationale: increase ROM and increase strength to improve the patients ability to participate in ADL's      12 [x] Therapeutic Activity See Flow Sheet  Rationale: To improve safety, proprioception, coordination, and efficiency with tasks such as stair negotiation, transfers, bed mobility, and lifting mechanics   8 [x] Neuromuscular Re-Education See Flow Sheet  Wobble Board 1' each    Rationale: To improve stability over various surfaces to decrease falls risk. Improve core control and posture control in various positions to maximize level of function. min Patient Education:  YES Reviewed HEP         Other Objective/Functional Measures:    See flowsheet for more details    Added Step downs, and wobble board   VC and demos required throughout session for proper form and increased safety         Post Treatment Pain Level (on 0 to 10) scale:   0  / 10     ASSESSMENT    Assessment/Changes in Function:     Tolerating session well with p!  At end range of knee flexion during AAROM movements. []  See Progress Note/Recertification   Patient will continue to benefit from skilled PT services to analyze,, cue,, progress,, modify,, demonstrate,, instruct, and address, movement patterns,, therapeutic interventions,, postural abnormalities,, soft tissue restrictions,, ROM,, strength,, functional mobility,, body mechanics/ergonomics, and home and community integration, to attain remaining goals.      Added step downs to improve right knee extension strength     PLAN    [x]  Upgrade activities as tolerated YES Continue plan of care   []  Discharge due to :    []  Other:      Therapist: Sabrina Delong DPT    Date: 1/7/2022 Time: 11:59 AM     Future Appointments   Date Time Provider Afsaneh Angeles   1/10/2022  2:45 PM Malaika Double [facility]   1/12/2022  1:15 PM Malaika Double [facility]   1/14/2022 11:45 AM Malaika Double [facility]   1/17/2022  1:15 PM Malaika Double [facility]   1/19/2022  1:15 PM Malaika Double [facility]   1/24/2022  1:15 PM Malaika Double [facility]   1/26/2022  1:15 PM Malaika Double [facility]   1/31/2022  1:15 PM Malaika Double [facility]   2/8/2022  1:20 PM ROSALIE Escobedo

## 2022-01-10 ENCOUNTER — HOSPITAL ENCOUNTER (OUTPATIENT)
Dept: PHYSICAL THERAPY | Age: 77
Discharge: HOME | End: 2022-01-10
Payer: MEDICARE

## 2022-01-10 PROCEDURE — 97110 THERAPEUTIC EXERCISES: CPT

## 2022-01-10 NOTE — PROGRESS NOTES
PHYSICAL THERAPY - DAILY TREATMENT NOTE    Patient Name: Mago Calle        Date: 1/10/2022  : 1945   YES Patient  Verified  Visit #:     Insurance: Payor: Amparo Casarez / Plan: [place] MEDICARE PART A & B / Product Type: Medicare /      In time: 245 Out time: 325   Total Treatment Time: 40     Medicare/BCBS Time Tracking (below)   Total Timed Codes (min):  40 1:1 Treatment Time:  40     TREATMENT AREA =  Right knee pain [M25.561]    SUBJECTIVE    Pain Level (on 0 to 10 scale):  1  / 10   Medication Changes/New allergies or changes in medical history, any new surgeries or procedures? NO    If yes, update Summary List   Subjective Functional Status/Changes:  []  No changes reported     Pt denies any adverse effects from last session. Compliance with HEP  Yes [] No []         OBJECTIVE  Therapeutic Procedures:  Min Procedure Specifics + Rationale   40(40) [x] Therapeutic Exercise    See Flowsheet   Rationale: increase ROM and increase strength to improve the patients ability to participate in ADL's         min Patient Education:  YES Reviewed HEP         Other Objective/Functional Measures:    See flowsheet for more details      min VC and demos required throughout session for proper form and increased safety         Post Treatment Pain Level (on 0 to 10) scale:   0  / 10     ASSESSMENT    Assessment/Changes in Function:     Great progress towards goals. []  See Progress Note/Recertification   Patient will continue to benefit from skilled PT services to analyze,, cue,, progress,, modify,, demonstrate,, instruct, and address, movement patterns,, therapeutic interventions,, postural abnormalities,, soft tissue restrictions,, ROM,, strength,, functional mobility,, body mechanics/ergonomics, and home and community integration, to attain remaining goals. Progress toward goals / Updated goals: · Short Term Goals: To be accomplished in  3  weeks:  1.  Pt will be compliant with HEP for symptom management at home. 2. Pt will demonstrate knee extension AROM to at least 2 deg from neutral to improve stance phase. 3. Pt will demonstrate knee flexion AROM to at least 95 deg to improve gait training. · Long Term Goals: To be accomplished in  6  weeks:  1. Pt will be independent with HEP at D/C for self management. 2. Pt will demonstrate knee flexion PROM to at least 105 deg to improve stair negotiation. 3. Pt will report being able to ambulate for 1 mile to improve quality of life. 4. Pt will increase FOTO Functional Status score to 67 to decrease functional limitations. 5. Pt will demonstrate knee extension strength of at least 5/5 to engage in age appropriate activities.    Progressed LAQ intensity     PLAN    [x]  Upgrade activities as tolerated YES Continue plan of care   []  Discharge due to :    []  Other:      Therapist: Jarod Lima DPT    Date: 1/10/2022 Time: 2:29 PM     Future Appointments   Date Time Provider Afsaneh Angeles   1/10/2022  2:45 PM Lorel Delio [facility]   1/12/2022  1:15 PM Lorel Delio [facility]   1/14/2022 11:45 AM Lorel Delio [facility]   1/17/2022  1:15 PM Lorel Delio [facility]   1/19/2022  1:15 PM Lorel Delio [facility]   1/24/2022  1:15 PM Lorel Delio [facility]   1/26/2022  1:15 PM Lorel Delio [facility]   1/31/2022  1:15 PM Lorel Delio [facility]   2/8/2022  1:20 PM [place]

## 2022-01-12 ENCOUNTER — HOSPITAL ENCOUNTER (OUTPATIENT)
Dept: PHYSICAL THERAPY | Age: 77
Discharge: HOME | End: 2022-01-12
Payer: MEDICARE

## 2022-01-12 PROCEDURE — 97110 THERAPEUTIC EXERCISES: CPT

## 2022-01-12 NOTE — PROGRESS NOTES
PHYSICAL THERAPY - DAILY TREATMENT NOTE    Patient Name: Med Lauren        Date: 2022  : 1945   YES Patient  Verified  Visit #:     Insurance: Payor: Myra Sales / Plan: [place] MEDICARE PART A & B / Product Type: Medicare /      In time: 115 Out time: 155   Total Treatment Time: 40     Medicare/BCBS Time Tracking (below)   Total Timed Codes (min):  40 1:1 Treatment Time:  40     TREATMENT AREA =  Right knee pain [M25.561]    SUBJECTIVE    Pain Level (on 0 to 10 scale):  2  / 10   Medication Changes/New allergies or changes in medical history, any new surgeries or procedures? NO    If yes, update Summary List   Subjective Functional Status/Changes:  []  No changes reported     [facility] reports doing fine after last session. No changes reported. Compliance with HEP  Yes [x] No []         OBJECTIVE  Therapeutic Procedures:  Min Procedure Specifics + Rationale   40(40) [x] Therapeutic Exercise    See Flowsheet   Rationale: increase ROM and increase strength to improve the patients ability to participate in ADL's              min Patient Education:  YES Reviewed HEP         Other Objective/Functional Measures:    See flowsheet for more details  Added TRX Squats  100 deg flexion AAROM during captain morgans. Mod/min VC and demos required throughout session for proper form and increased safety         Post Treatment Pain Level (on 0 to 10) scale:   1-2  / 10     ASSESSMENT    Assessment/Changes in Function:     Pt responding well to current program and tolerating sessions well. Knee flexion ROM improving as measured today.       []  See Progress Note/Recertification   Patient will continue to benefit from skilled PT services to analyze,, cue,, progress,, modify,, demonstrate,, instruct, and address, movement patterns,, therapeutic interventions,, postural abnormalities,, soft tissue restrictions,, ROM,, strength,, functional mobility,, body mechanics/ergonomics, and home and community integration, to attain remaining goals. Progress toward goals / Updated goals: · Short Term Goals: To be accomplished in  3  weeks:  1. Pt will be compliant with HEP for symptom management at home. 2. Pt will demonstrate knee extension AROM to at least 2 deg from neutral to improve stance phase. 3. Pt will demonstrate knee flexion AROM to at least 95 deg to improve gait training. · Long Term Goals: To be accomplished in  6  weeks:  1. Pt will be independent with HEP at D/C for self management. 2. Pt will demonstrate knee flexion PROM to at least 105 deg to improve stair negotiation. 3. Pt will report being able to ambulate for 1 mile to improve quality of life. 4. Pt will increase FOTO Functional Status score to 67 to decrease functional limitations. 5. Pt will demonstrate knee extension strength of at least 5/5 to engage in age appropriate activities.    Added TRX squats     PLAN    [x]  Upgrade activities as tolerated YES Continue plan of care   []  Discharge due to :    []  Other:      Therapist: Erica Spear DPT    Date: 1/12/2022 Time: 1:20 PM     Future Appointments   Date Time Provider Afsaneh Angeles   1/14/2022 11:45 AM Elder Arbour [facility]   1/17/2022  1:15 PM Elder Arbour [facility]   1/19/2022  1:15 PM Elder Arbour [facility]   1/24/2022  1:15 PM Elder Arbour [facility]   1/26/2022  1:15 PM Elder Arbour [facility]   1/31/2022  1:15 PM [facility]   2/8/2022  1:20 PM [facility] [place] [address] [facility]

## 2022-01-14 ENCOUNTER — HOSPITAL ENCOUNTER (OUTPATIENT)
Dept: PHYSICAL THERAPY | Age: 77
Discharge: HOME | End: 2022-01-14
Payer: MEDICARE

## 2022-01-14 PROCEDURE — 97140 MANUAL THERAPY 1/> REGIONS: CPT

## 2022-01-14 PROCEDURE — 97110 THERAPEUTIC EXERCISES: CPT

## 2022-01-14 NOTE — PROGRESS NOTES
PHYSICAL THERAPY - DAILY TREATMENT NOTE    Patient Name: Kelsey Due        Date: 2022  : 1945   YES Patient  Verified  Visit #:     Insurance: Payor: Jose Abbot / Plan: [place] MEDICARE PART A & B / Product Type: Medicare /      In time:  Out time:    Total Treatment Time: 40     Medicare/BCBS Time Tracking (below)   Total Timed Codes (min):  40 1:1 Treatment Time:  23     TREATMENT AREA =  Right knee pain [M25.561]    SUBJECTIVE    Pain Level (on 0 to 10 scale):  1  / 10   Medication Changes/New allergies or changes in medical history, any new surgeries or procedures? NO    If yes, update Summary List   Subjective Functional Status/Changes:  []  No changes reported     Pt reports feeling good after last session. Compliance with HEP  Yes [] No []         OBJECTIVE  Therapeutic Procedures:  Min Procedure Specifics + Rationale   30(13) [x] Therapeutic Exercise    See Flowsheet   Rationale: increase ROM and increase strength to improve the patients ability to participate in ADL's    10 [x]  Manual Therapy        In Supine:  Right Extension/Flexion PROM c Tibial AP/PA Mobilization  HS stretch  Rationale: decrease pain, increase ROM, increase tissue extensibility, decrease trigger points and increase postural awareness to attain functional use and participation with ADL's    The manual therapy interventions were performed at a separate and distinct time from the therapeutic activities interventions. min Patient Education:  YES Reviewed HEP         Other Objective/Functional Measures:    See flowsheet for more details    Mod/min VC and demos required throughout session for proper form and increased safety         Post Treatment Pain Level (on 0 to 10) scale:   1  / 10     ASSESSMENT    Assessment/Changes in Function:     No increases in symptoms within session. Apprehension at end range of flexion and extension during manual therapy.      []  See Progress Note/Recertification   Patient will continue to benefit from skilled PT services to analyze,, cue,, progress,, modify,, demonstrate,, instruct, and address, movement patterns,, therapeutic interventions,, postural abnormalities,, soft tissue restrictions,, ROM,, strength,, functional mobility,, body mechanics/ergonomics, and home and community integration, to attain remaining goals. Progress toward goals / Updated goals:    Performed manual therapy to address ROM goals.       PLAN    [x]  Upgrade activities as tolerated YES Continue plan of care   []  Discharge due to :    []  Other:      Therapist: Fer Hoang DPT    Date: 1/14/2022 Time: 11:53 AM     Future Appointments   Date Time Provider Afsaneh Angeles   1/17/2022  1:15 PM Berdie Pair [facility]   1/19/2022  1:15 PM Berdie Pair [facility]   1/24/2022  1:15 PM Berdie Pair [facility]   1/26/2022  1:15 PM Berdie Pair [facility]   1/31/2022  1:15 PM Berdie Pair [facility]   2/8/2022  1:20 PM [facility], [place] [address] [facility]

## 2022-01-17 ENCOUNTER — HOSPITAL ENCOUNTER (OUTPATIENT)
Dept: PHYSICAL THERAPY | Age: 77
Discharge: HOME | End: 2022-01-17
Payer: MEDICARE

## 2022-01-17 PROCEDURE — 97110 THERAPEUTIC EXERCISES: CPT

## 2022-01-17 NOTE — PROGRESS NOTES
PHYSICAL THERAPY - DAILY TREATMENT NOTE    Patient Name: Marie Espinosa        Date: 2022  : 1945   YES Patient  Verified  Visit #:     Insurance: Payor: Eliazargloria Nones / Plan: [place] MEDICARE PART A & B / Product Type: Medicare /      In time: 115 Out time: 155   Total Treatment Time: 40     Medicare/BCBS Time Tracking (below)   Total Timed Codes (min):  40 1:1 Treatment Time:  40     TREATMENT AREA =  Right knee pain [M25.561]    SUBJECTIVE    Pain Level (on 0 to 10 scale):  3  / 10   Medication Changes/New allergies or changes in medical history, any new surgeries or procedures? NO    If yes, update Summary List   Subjective Functional Status/Changes:  []  No changes reported     Pt reports increased tightness in knee today. OBJECTIVE  Therapeutic Procedures:  Min Procedure Specifics + Rationale   40(40) [x] Therapeutic Exercise    See Flowsheet   Rationale: increase ROM and increase strength to improve the patients ability to participate in ADL's         min Patient Education:  YES Reviewed HEP         Other Objective/Functional Measures:    See flowsheet for more details      Added weight to sit<>stand   VC and demos required throughout session for proper form and increased safety         Post Treatment Pain Level (on 0 to 10) scale:   2  / 10     ASSESSMENT    Assessment/Changes in Function:     Symptoms decreased by 1 grade, decreased cues for exercise and tolerating sessions well.      []  See Progress Note/Recertification   Patient will continue to benefit from skilled PT services to analyze,, cue,, progress,, modify,, demonstrate,, instruct, and address, movement patterns,, therapeutic interventions,, postural abnormalities,, soft tissue restrictions,, ROM,, strength,, functional mobility,, body mechanics/ergonomics, and home and community integration, to attain remaining goals. Progress toward goals / Updated goals:    1.  Pt will be compliant with HEP for symptom management at home. 2. Pt will demonstrate knee extension AROM to at least 2 deg from neutral to improve stance phase.  -4 deg from neutral  3. Pt will demonstrate knee flexion AROM to at least 95 deg to improve gait training.      PLAN    [x]  Upgrade activities as tolerated YES Continue plan of care   []  Discharge due to :    []  Other:      Therapist: Indy Lucero DPT    Date: 1/17/2022 Time: 1:30 PM     Future Appointments   Date Time Provider Afsaneh Angeles   1/19/2022  1:15 PM [facility]   1/24/2022  1:15 PM Shelli Flores [facility]   1/26/2022  1:15 PM Shelli Flores [facility]   1/31/2022  1:15 PM [facility]   2/8/2022  1:20 PM Luisa, Melvinia Bence, NP [address]

## 2022-01-19 ENCOUNTER — HOSPITAL ENCOUNTER (OUTPATIENT)
Dept: PHYSICAL THERAPY | Age: 77
Discharge: HOME | End: 2022-01-19
Payer: MEDICARE

## 2022-01-19 PROCEDURE — 97110 THERAPEUTIC EXERCISES: CPT

## 2022-01-19 NOTE — PROGRESS NOTES
PHYSICAL THERAPY - DAILY TREATMENT NOTE    Patient Name: Mario Neff        Date: 2022  : 1945   YES Patient  Verified  Visit #:     Insurance: Payor: Tera Lara / Plan: [place] MEDICARE PART A & B / Product Type: Medicare /      In time: 110 Out time: 203   Total Treatment Time: 53     Medicare/BCBS Time Tracking (below)   Total Timed Codes (min):  53 1:1 Treatment Time:  53     TREATMENT AREA =  Right knee pain [M25.561]    SUBJECTIVE    Pain Level (on 0 to 10 scale):  2-3  / 10   Medication Changes/New allergies or changes in medical history, any new surgeries or procedures? NO    If yes, update Summary List   Subjective Functional Status/Changes:  []  No changes reported     Pt states that she is feeling tight today  \"when I was younger I used to stretch my achilles tendon for horse riding a lot\"         OBJECTIVE  Therapeutic Procedures:  Min Procedure Specifics + Rationale   53(53) [x] Therapeutic Exercise    See Flowsheet   Rationale: increase ROM and increase strength to improve the patients ability to participate in ADL's              min Patient Education:  YES Reviewed HEP         Other Objective/Functional Measures:    See flowsheet for more details    Pt able to intermittently perform full rotations with bike today but self-selected rocks mostly due to increased pain. Added prone quad stretches    Mod/min VC and demos required throughout session for proper form and increased safety         Post Treatment Pain Level (on 0 to 10) scale:   2-3  / 10     ASSESSMENT    Assessment/Changes in Function:     Pt able to tolerate most movements without increases in pain.       []  See Progress Note/Recertification   Patient will continue to benefit from skilled PT services to analyze,, cue,, progress,, modify,, demonstrate,, instruct, and address, movement patterns,, therapeutic interventions,, postural abnormalities,, soft tissue restrictions,, ROM,, strength,, functional mobility,, body mechanics/ergonomics, and home and community integration, to attain remaining goals.    Progress toward goals / Updated goals:    Flexion AAROM improved with captain mark      PLAN    [x]  Upgrade activities as tolerated YES Continue plan of care   []  Discharge due to :    []  Other:      Therapist: Kelly Gutierrez DPT    Date: 1/19/2022 Time: 11:44 AM     Future Appointments   Date Time Provider Afsaneh Angeles   1/19/2022  1:15 PM [facility]   1/24/2022  1:15 PM Dionisio Harbour [facility]   1/26/2022  1:15 PM Dionisio Harbour [facility]   1/31/2022  1:[facility] PM [facility]   2/8/2022  1:20 PM [facility], [place] [address] [facility]

## 2022-01-24 ENCOUNTER — HOSPITAL ENCOUNTER (OUTPATIENT)
Dept: PHYSICAL THERAPY | Age: 77
Discharge: HOME | End: 2022-01-24
Payer: MEDICARE

## 2022-01-24 PROCEDURE — 97140 MANUAL THERAPY 1/> REGIONS: CPT

## 2022-01-24 PROCEDURE — 97110 THERAPEUTIC EXERCISES: CPT

## 2022-01-24 NOTE — PROGRESS NOTES
[address] [facility] PHYSICAL THERAPY  [address] [facility] [address] [place], [address] - Phone: [phone]  Fax: [phone]  Progress Note/CONTINUED R Gil [address]ator[address] for PHYSICAL THERAPY          Patient Name: Kelsey Cornejo : 1945   Treatment/Medical Diagnosis: Right knee pain [M25.561]   Referral Source: Brenda Mendes MD Start of Care [facility]): 2021   Prior Hospitalization: See Medical History Provider #:  [id number]   Medications: Verified on Patient Summary List   Visits from [facility]: 9 Missed Visits: 0   SUMMARY OF TREATMENT  Patient's POC has consisted of therex, therapeutic activities, manual therapy prn, modalities prn, pt. education, and a comprehensive HEP. Treatment strategies used to address functional mobility deficits, ROM deficits, strength deficits, analyze and address soft tissue restrictions, analyze and cue movement patterns, analyze and modify body mechanics/ergonomics, assess and modify postural abnormalities and instruct in home and community integration. CURRENT STATUS  Ira Kinsey reports continued difficulty with negotiating stairs, prolonged walking, difficulty with floor to stand transfers. She reports 60% improvement since start of care. Continues to have medial patellofemoral pain. Right knee ROM has improved slightly from initial evaluation. However within sessions, she is able to improve ROM a great deal (see below). Strength has improved a great deal but hip flexion strength still remains limited. Able to perform sit<>stand transfers weighted but unable to perform full squat. She ambulates without assistive device currently.     Gait:  Independent without AD                                  AROM PROM Strength   Hip Flexion (0-120) NT NT   4+   Knee Flexion (0-135) 95 (104 after manual)  105 (108 after manual)  5     Extension (0)  5 4   5   Ankle Plantarflexion (0-50) WFL  NT   5     Dorsiflexion (0-20)  WFL  NT 5      GOALS               Goal/Measure of Progress Goal Met? 1.  Pt will be compliant with HEP for symptom management at home. Status at last Eval: NA Current Status: Compliant MET   2.  Pt will demonstrate knee extension AROM to at least 2 deg from neutral to improve stance phase. Status at last Eval: Lacking 7 deg Current Status: Lacking 5 deg NOT MET   3.  Pt will demonstrate knee flexion AROM to at least 95 deg to improve gait training. Status at last Eval: 86 deg Current Status: 95 deg MET              4.  Pt will report being able to ambulate for 1 mile to improve quality of life. Status at last Eval: Requiring [facility] Current Status: 1/2 Mile without AD NOT MET   5.  Pt will demonstrate knee extension strength of at least 5/5 to engage in age appropriate activities. Status at last Eval: 4+/5 Current Status: 5/5 MET        New Goals to be achieved in   4-5  weeks:  1. Pt will demonstrate knee extension AROM to at least 2 deg from neutral to improve stance phase. 2. Pt will be independent with HEP at D/C for self management. 3. Pt will demonstrate knee flexion PROM to at least 105 deg to improve stair negotiation. 4. Pt will report being able to ambulate for 1 mile to improve quality of life. 5. Pt will increase FOTO Functional Status score to 67 to decrease functional limitations. Frequency / Duration:   Patient to be seen   2   times per week for   4-5    weeks:    RECOMMENDATIONS: Continue and progress functional therex/therapeutic activity as able, utilizing manual therapy and modalities prn. Progress patient towards independent HEP to facilitate self-management of symptoms and progress gains after PT. If you have any questions/comments please contact us directly at [phone]. Thank you for allowing us to assist in the care of your patient.     Therapist Signature: Mandy Palmer DPT Date: 7/81/4971   Certification Period:  Reporting Period: 12/22/2021 - 3/21/2022  12/22/2021 - 1/24/2022 Time: 1:23 PM   NOTE TO PHYSICIAN: PLEASE COMPLETE THE ORDERS BELOW AND FAX TO   [facility] [place] [facility]: [phone]. If you are unable to process this request in 24 hours please contact our office: [phone].    ___ I have read the above report and request that my patient continue as recommended.   ___ I have read the above report and request that my patient continue therapy with the following changes/special instructions: ________________________________________________   ___ I have read the above report and request that my patient be discharged from therapy.      Physician Signature:        Date:       Time:    Jose Maria Milner MD

## 2022-01-24 NOTE — PROGRESS NOTES
PHYSICAL THERAPY - DAILY TREATMENT NOTE    Patient Name: Marie Espinosa        Date: 2022  : 1945   YES Patient  Verified  Visit #:   10   of   12  Insurance: Payor: Arianne Nones / Plan: [place] MEDICARE PART A & B / Product Type: Medicare /      In time: 118 Out time: 210   Total Treatment Time: 52     Medicare/BCBS Time Tracking (below)   Total Timed Codes (min):  52 1:1 Treatment Time:  52     TREATMENT AREA =  Right knee pain [M25.561]    SUBJECTIVE    Pain Level (on 0 to 10 scale):  2  / 10   Medication Changes/New allergies or changes in medical history, any new surgeries or procedures? NO    If yes, update Summary List   Subjective Functional Status/Changes:  []  No changes reported   Mihir Arzola reports continued difficulty with negotiating stairs, prolonged walking, difficulty with floor to stand transfers. She reports 60% improvement since start of care. Continues to have medial     Compliance with HEP  Yes [] No []         OBJECTIVE  Therapeutic Procedures:  Min Procedure Specifics + Rationale   42(42) [x] Therapeutic Exercise    See Flowsheet   Rationale: increase ROM and increase strength to improve the patients ability to participate in ADL's    10 [x]  Manual Therapy        Right Knee in Supine  -Tibial ER Mobilization with Knee Extensions PROM   -Tibial AP Mobilization, Inferior Patella Mobilization with Knee Flexion PROM  -Placement of static cupping to medial gastroc, distal quad/VL/VM, M/L infrapatellar sulcus  Rationale: decrease pain, increase ROM, increase tissue extensibility, decrease trigger points and increase postural awareness to attain functional use and participation with ADL's    The manual therapy interventions were performed at a separate and distinct time from the therapeutic activities interventions.       min Patient Education:  YES Reviewed HEP         Other Objective/Functional Measures:    See flowsheet for more details    Gait:  Independent without AD                                  AROM PROM Strength   Hip Flexion (0-120) NT NT   4+   Knee Flexion (0-135) 95 (104 after manual)  105 (108 after manual)  5     Extension (0)  5 4   5   Ankle Plantarflexion (0-50) WFL  NT   5     Dorsiflexion (0-20)  WFL  NT 5        VC and demos required throughout session for proper form and increased safety         Post Treatment Pain Level (on 0 to 10) scale:   2  / 10     ASSESSMENT    Assessment/Changes in Function:     See PN     []  See Progress Note/Recertification   Patient will continue to benefit from skilled PT services to analyze,, cue,, progress,, modify,, demonstrate,, instruct, and address, movement patterns,, therapeutic interventions,, postural abnormalities,, soft tissue restrictions,, ROM,, strength,, functional mobility,, body mechanics/ergonomics, and home and community integration, to attain remaining goals. Progress toward goals / Updated goals:            Goal/Measure of Progress Goal Met? 1. Pt will be compliant with HEP for symptom management at home. Status at last Eval: NA Current Status: Compliant MET   2. Pt will demonstrate knee extension AROM to at least 2 deg from neutral to improve stance phase. Status at last Eval: Lacking 7 deg Current Status: Lacking 5 deg NOT MET   3. Pt will demonstrate knee flexion AROM to at least 95 deg to improve gait training. Status at last Eval: 86 deg Current Status: 95 deg MET      4. Pt will report being able to ambulate for 1 mile to improve quality of life. Status at last Eval: Requiring [facility] Current Status: 1/2 Mile without AD NOT MET   5. Pt will demonstrate knee extension strength of at least 5/5 to engage in age appropriate activities.     Status at last Eval: 4+/5 Current Status: 5/5 MET         PLAN    [x]  Upgrade activities as tolerated YES Continue plan of care   []  Discharge due to :    []  Other:      Therapist: Kelly Gutierrez DPT    Date: 1/24/2022 Time: 1:19 PM     Future Appointments   Date Time Provider Afsaneh Angeles   1/26/2022  1:15 PM [facility]   1/31/2022  1:15 PM [facility]   2/8/2022  1:20 PM Luisa, Jimi Oakes, NP [address]

## 2022-01-26 ENCOUNTER — HOSPITAL ENCOUNTER (OUTPATIENT)
Dept: PHYSICAL THERAPY | Age: 77
Discharge: HOME | End: 2022-01-26
Payer: MEDICARE

## 2022-01-26 PROCEDURE — 97110 THERAPEUTIC EXERCISES: CPT

## 2022-01-26 NOTE — PROGRESS NOTES
PHYSICAL THERAPY - DAILY TREATMENT NOTE    Patient Name: Franki Lobato        Date: 2022  : 1945   YES Patient  Verified  Visit #:     Insurance: Payor: Reggie Grew / Plan: [place] MEDICARE PART A & B / Product Type: Medicare /      In time: 115 Out time: 200   Total Treatment Time: 45     Medicare/BCBS Time Tracking (below)   Total Timed Codes (min):  45 1:1 Treatment Time:  45     TREATMENT AREA =  Right knee pain [M25.561]    SUBJECTIVE    Pain Level (on 0 to 10 scale):  3  / 10   Medication Changes/New allergies or changes in medical history, any new surgeries or procedures? NO    If yes, update Summary List   Subjective Functional Status/Changes:  []  No changes reported     \"my knee doesn't like the cold\"    Compliance with HEP  Yes [] No []         OBJECTIVE  Therapeutic Procedures:  Min Procedure Specifics + Rationale   45(45) [x] Therapeutic Exercise    See Flowsheet   Rationale: increase ROM and increase strength to improve the patients ability to participate in ADL's         min Patient Education:  YES Reviewed HEP         Other Objective/Functional Measures:    See flowsheet for more details    Flexion c [place] 106 deg  Extension with quad sets 6 deg lacking    Emphasized importance of 15 minutes bouts of low load knee extension stretched at least twice a day. min VC and demos required throughout session for proper form and increased safety         Post Treatment Pain Level (on 0 to 10) scale:   0  / 10     ASSESSMENT    Assessment/Changes in Function:   Tolerating physical therapy sessions well without reports of increased pain during. Extension AROM demonstrating possible plateau in progress.      []  See Progress Note/Recertification   Patient will continue to benefit from skilled PT services to analyze,, cue,, progress,, modify,, demonstrate,, instruct, and address, movement patterns,, therapeutic interventions,, postural abnormalities,, soft tissue restrictions,, ROM,, strength,, functional mobility,, body mechanics/ergonomics, and home and community integration, to attain remaining goals. Progress toward goals / Updated goals:    1st session since progress note no notable progress yet.       PLAN    [x]  Upgrade activities as tolerated YES Continue plan of care   []  Discharge due to :    []  Other:      Therapist: [place] DPT    Date: 1/26/2022 Time: 1:17 PM     Future Appointments   Date Time Provider Afsaneh Angeles   1/31/2022  1:15 PM Murphyel Flint [facility]   2/4/2022  1:15 PM Murphyel Flint [facility]   2/7/2022  1:15 PM [address] [facility]   2/8/2022  1:20 PM [facility] NP [address] [facility]   2/11/2022  1:15 PM Murphy Flint [facility]   2/14/2022  1:15 PM Murphy Flint [facility]   2/18/2022  1:15 PM Murphyel Flint [facility]   2/21/2022  1:15 PM Murphy Flint [facility]   2/25/2022  1:15 PM [address] [facility]   2/28/2022  1:15 PM [place], [address]

## 2022-01-31 ENCOUNTER — HOSPITAL ENCOUNTER (OUTPATIENT)
Dept: PHYSICAL THERAPY | Age: 77
Discharge: HOME | End: 2022-01-31
Payer: MEDICARE

## 2022-01-31 PROCEDURE — 97110 THERAPEUTIC EXERCISES: CPT

## 2022-01-31 NOTE — PROGRESS NOTES
PHYSICAL THERAPY - DAILY TREATMENT NOTE    Patient Name: Marie Espinosa        Date: 2022  : 1945   YES Patient  Verified  Visit #:     Insurance: Payor: Arianne Nones / Plan: [place] MEDICARE PART A & B / Product Type: Medicare /      In time: 114 Out time: 206   Total Treatment Time: 50     Medicare/BCBS Time Tracking (below)   Total Timed Codes (min):  50 1:1 Treatment Time:  38     TREATMENT AREA =  Right knee pain [M25.561]    SUBJECTIVE    Pain Level (on 0 to 10 scale):  3  / 10   Medication Changes/New allergies or changes in medical history, any new surgeries or procedures? NO    If yes, update Summary List   Subjective Functional Status/Changes:  []  No changes reported     Pt reports that she has been trying the prone knee hang for 13-15 minutes. She states that she is having increased ease with ascending stairs but continued difficulty with descending. OBJECTIVE  Therapeutic Procedures:  Min Procedure Specifics + Rationale   50(38) [x] Therapeutic Exercise    See Flowsheet   Rationale: increase ROM and increase strength to improve the patients ability to participate in ADL's         min Patient Education:  YES Reviewed HEP         Other Objective/Functional Measures:    See flowsheet for more details    Added SL Balance. Mod/min VC and demos required throughout session for proper form and increased safety         Post Treatment Pain Level (on 0 to 10) scale:   4  / 10     ASSESSMENT    Assessment/Changes in Function:     Pt tolerated session without increases in pain.  Improving in independence with program.     []  See Progress Note/Recertification   Patient will continue to benefit from skilled PT services to analyze,, cue,, progress,, modify,, demonstrate,, instruct, and address, movement patterns,, therapeutic interventions,, postural abnormalities,, soft tissue restrictions,, ROM,, strength,, functional mobility,, body mechanics/ergonomics, and home and community integration, to attain remaining goals. Progress toward goals / Updated goals:    1. Pt will demonstrate knee extension AROM to at least 2 deg from neutral to improve stance phase. 2. Pt will be independent with HEP at D/C for self management. 3. Pt will demonstrate knee flexion [facility] to [facility] to improve stair negotiation. MET  4. Pt will report being able to ambulate for 1 mile to improve quality of life. 5. Pt will increase FOTO Functional Status score to 67 to decrease functional limitations.      PLAN    [x]  Upgrade activities as tolerated YES Continue plan of care   []  Discharge due to :    []  Other:      Therapist: Mandy Palmer DPT    Date: 1/31/2022 Time: 1:14 PM     Future Appointments   Date Time Provider Afsaneh Angeles   1/31/2022  1:15 PM Weber Merlin [facility]   2/4/2022  1:15 PM Weber Merlin [facility]   2/7/2022  1:15 PM Weber Merlin [facility]   2/8/2022  1:20 [facility] [address]   2/11/2022  1:15 PM Weber Merlin [facility]   2/14/2022  1:15 PM Weber Merlin [facility]   2/18/2022  1:15 PM Weber Merlin [facility]   2/21/2022  1:15 PM Weber Merlin [facility]   2/25/2022  1:15 PM Weber Merlin [facility]   2/28/2022  1:15 PM Mike Andrade [address]

## 2022-02-04 ENCOUNTER — HOSPITAL ENCOUNTER (OUTPATIENT)
Dept: PHYSICAL THERAPY | Age: 77
Discharge: HOME | End: 2022-02-04
Payer: MEDICARE

## 2022-02-04 PROCEDURE — 97110 THERAPEUTIC EXERCISES: CPT

## 2022-02-04 NOTE — PROGRESS NOTES
PHYSICAL THERAPY - DAILY TREATMENT NOTE    Patient Name: Barnett Bamberger        Date: 2022  : 1945   YES Patient  Verified  Visit #:   15   of   20  Insurance: Payor: [employer] / Plan: [place] MEDICARE PART A & B / Product Type: Medicare /      In time:  Out time: 488   Total Treatment Time: 40     Medicare/BCBS Time Tracking (below)   Total Timed Codes (min):  40 1:1 Treatment Time:  40     TREATMENT AREA =  Right knee pain [M25.561]    SUBJECTIVE    Pain Level (on 0 to 10 scale):  3  / 10   Medication Changes/New allergies or changes in medical history, any new surgeries or procedures? NO    If yes, update Summary List   Subjective Functional Status/Changes:  []  No changes reported     [facility] [place] [facility] increased stiffness in knee today and felt good after last session     Compliance with HEP  Yes [] No []         OBJECTIVE  Therapeutic Procedures:  Min Procedure Specifics + Rationale   40(40) [x] Therapeutic Exercise    See Flowsheet   Rationale: increase ROM and increase strength to improve the patients ability to participate in ADL's         min Patient Education:  YES Reviewed HEP         Other Objective/Functional Measures:    See flowsheet for more details    Added weighted prone knee hangs and curls  Decreased anterior tibial translation of right knee during split squats     VC and demos required throughout session for proper form and increased safety         Post Treatment Pain Level (on 0 to 10) scale:   0  / 10     ASSESSMENT    Assessment/Changes in Function:     Symptoms resolved within treatment session.      []  See Progress Note/Recertification   Patient will continue to benefit from skilled PT services to analyze,, cue,, progress,, modify,, demonstrate,, instruct, and address, movement patterns,, therapeutic interventions,, postural abnormalities,, soft tissue restrictions,, ROM,, strength,, functional mobility,, body mechanics/ergonomics, and home and community integration, to attain remaining goals. Progress toward goals / Updated goals:    1. Pt will demonstrate knee extension AROM to at least 2 deg from neutral to improve stance phase. 2. Pt will be independent with HEP at D/C for self management. 3. Pt will demonstrate knee flexion PROM to at [place] deg to improve stair negotiation. 4. Pt will report being able to ambulate for 1 mile to improve quality of life. 5. Pt will increase FOTO Functional Status score to 67 to decrease functional limitations.      PLAN    [x]  Upgrade activities as tolerated YES Continue plan of care   []  Discharge due to :    []  Other:      Therapist: Amber Yo DPT    Date: 2/4/2022 Time: 11:41 AM     Future Appointments   Date Time Provider Afsaneh Angeles   2/4/2022 11:45 AM [facility]   2/8/2022  1:20 PM [facility], [place]   2/8/2022  2:45 PM Carineena Haley [facility]   2/11/2022  1:15 PM Carineena Haley [facility]   2/14/2022  1:15 PM Carineena Haley [facility]   2/18/2022  1[facility]   2/21/2022  1:15 PM Carineena Haley [facility]   2/25/2022  1:15 PM Carineena Haley [facility]   2/28/2022  1:15 PM [address], [address]

## 2022-02-07 ENCOUNTER — APPOINTMENT (OUTPATIENT)
Dept: PHYSICAL THERAPY | Age: 77
End: 2022-02-07
Payer: MEDICARE

## 2022-02-08 ENCOUNTER — APPOINTMENT (OUTPATIENT)
Dept: PHYSICAL THERAPY | Age: 77
End: 2022-02-08
Payer: MEDICARE

## 2022-02-11 ENCOUNTER — HOSPITAL ENCOUNTER (OUTPATIENT)
Dept: PHYSICAL THERAPY | Age: 77
End: 2022-02-11
Payer: MEDICARE

## 2022-02-14 ENCOUNTER — HOSPITAL ENCOUNTER (OUTPATIENT)
Dept: PHYSICAL THERAPY | Age: 77
End: 2022-02-14
Payer: MEDICARE

## 2022-02-17 ENCOUNTER — HOSPITAL ENCOUNTER (OUTPATIENT)
Dept: PHYSICAL THERAPY | Age: 77
Discharge: HOME | End: 2022-02-17
Payer: MEDICARE

## 2022-02-17 PROCEDURE — 97110 THERAPEUTIC EXERCISES: CPT

## 2022-02-17 NOTE — PROGRESS NOTES
PHYSICAL THERAPY - DAILY TREATMENT NOTE    Patient Name: Le Law        Date: 2022  : 1945   YES Patient  Verified  Visit #:     Insurance: Payor: Ayana Wing / Plan: [place] MEDICARE PART A & B / Product Type: Medicare /      In time: 971 Out time: 845   Total Treatment Time: 51     Medicare/BCBS Time Tracking (below)   Total Timed Codes (min):  51 1:1 Treatment Time:  38     TREATMENT AREA =  Right knee pain [M25.561]    SUBJECTIVE    Pain Level (on 0 to 10 scale):  0  / 10   Medication Changes/New allergies or changes in medical history, any new surgeries or procedures? NO    If yes, update Summary List   Subjective Functional Status/Changes:  []  No changes reported     Gabrielle Salgado reports that she is feeling much better today. Prednisone has resolved symptoms. OBJECTIVE  Therapeutic Procedures:  Min Procedure Specifics + Rationale   51(38) [x] Therapeutic Exercise    See Flowsheet   Rationale: increase ROM and increase strength to improve the patients ability to participate in ADL's         min Patient Education:  YES Reviewed HEP         Other Objective/Functional Measures:    See flowsheet for more details    Right Knee AROM: 2-110 (115 with AAROM)    Min/mod VC and demos required throughout session for proper form and increased safety         Post Treatment Pain Level (on 0 to 10) scale:   0  / 10     ASSESSMENT    Assessment/Changes in Function:     Gabrielle Salgado has made improvements to knee extension and flexion AROM. []  See Progress Note/Recertification   Patient will continue to benefit from skilled PT services to analyze,, cue,, progress,, modify,, demonstrate,, instruct, and address, movement patterns,, therapeutic interventions,, postural abnormalities,, soft tissue restrictions,, ROM,, strength,, functional mobility,, body mechanics/ergonomics, and home and community integration, to attain remaining goals. Progress toward goals / Updated goals:       1.  Pt will demonstrate knee extension AROM to at least 2 deg from neutral to improve stance phase. MET: 2 deg lacking  2. Pt will be independent with HEP at D/C for self management. 3. Pt will demonstrate [facility] to improve stair negotiation. MET: 115 deg AAROM  4. Pt will report being able to ambulate for 1 mile to improve quality of life. 5. Pt will increase FOTO Functional Status score to 67 to decrease functional limitations.      PLAN    [x]  Upgrade activities as tolerated YES Continue plan of care   []  Discharge due to :    []  Other:      Therapist: Juan Pablo Petersen DPT    Date: 2/17/2022 Time: 7:56 AM     Future Appointments   Date Time Provider Afsaneh Angeles   2/17/2022  8:00 AM [facility]   2/21/2022  1:15 PM [facility]   2/25/2022  1:15 PM Ajit [facility]   2/28/2022  1:15 PM yaneth [facility]   3/7/2022 11:20 AM [place]ne Moran [place]

## 2022-02-18 ENCOUNTER — APPOINTMENT (OUTPATIENT)
Dept: PHYSICAL THERAPY | Age: 77
End: 2022-02-18
Payer: MEDICARE

## 2022-02-21 ENCOUNTER — HOSPITAL ENCOUNTER (OUTPATIENT)
Dept: PHYSICAL THERAPY | Age: 77
Discharge: HOME | End: 2022-02-21
Payer: MEDICARE

## 2022-02-21 PROCEDURE — 97530 THERAPEUTIC ACTIVITIES: CPT

## 2022-02-21 PROCEDURE — 97110 THERAPEUTIC EXERCISES: CPT

## 2022-02-21 PROCEDURE — 97112 NEUROMUSCULAR REEDUCATION: CPT

## 2022-02-21 NOTE — PROGRESS NOTES
PHYSICAL THERAPY - DAILY TREATMENT NOTE    Patient Name: Odilon Villa        Date: 2022  : 1945   YES Patient  Verified  Visit #:   15   of   20  Insurance: Payor: Shaheen Haddad / Plan: [place] MED[place] & B / Product Type: Medicare /      In time: 111 Out time: 205   Total Treatment Time: 54     Medicare/BCBS Time Tracking (below)   Total Timed Codes (min):  54 1:1 Treatment Time:  54     TREATMENT AREA =  Right knee pain [M25.561]    SUBJECTIVE    Pain Level (on 0 to 10 scale):  0  / 10   Medication Changes/New allergies or changes in medical history, any new surgeries or procedures? NO    If yes, update Summary List   Subjective Functional Status/Changes:  []  No changes reported     54    Compliance with HEP  Yes [] No []         OBJECTIVE  Therapeutic Procedures:  Min Procedure Specifics + Rationale   34(34) [x] Therapeutic Exercise    See Flowsheet   Rationale: increase ROM and increase strength to improve the patients ability to participate in ADL's      10 [x] Therapeutic Activity See Flow Sheet  Rationale: To improve safety, proprioception, coordination, and efficiency with tasks such as stair negotiation, transfers, bed mobility, and lifting mechanics   10 [x] Neuromuscular Re-Education See Flow Sheet  Rationale: To improve stability over various surfaces to decrease falls risk. Improve core control and posture control in various positions to maximize level of function. min Patient Education:  YES Reviewed HEP         Other Objective/Functional Measures:    See flowsheet for more details    Added reverse TRX lunges    min VC and demos required throughout session for proper form and increased safety         Post Treatment Pain Level (on 0 to 10) scale:   0  / 10     ASSESSMENT    Assessment/Changes in Function:     Pt is making great improvements towards restoring her function. SLS static balance was improved compared to last session.       []  See Progress Note/Recertification   Patient will continue to benefit from skilled PT services to analyze,, cue,, progress,, modify,, demonstrate,, instruct, and address, movement patterns,, therapeutic interventions,, postural abnormalities,, soft tissue restrictions,, ROM,, strength,, functional mobility,, body mechanics/ergonomics, and home and community integration, to attain remaining goals. Progress toward goals / Updated goals:    1. Pt will demonstrate knee extension AROM to at least 2 deg from neutral to improve stance phase. 2. Pt will be independent with HEP at D/C for self management. Improving independence  3. Pt will demonstrate knee flexion PROM to at [facility] deg to improve stair negotiation. MET  4. Pt will report being able to ambulate for 1 mile to improve quality of life. 5. Pt will increase FOTO Functional Status score to 67 to decrease functional limitations.  Added lunges to improve functional limitations     PLAN    [x]  Upgrade activities as tolerated YES Continue plan of care   []  Discharge due to :    []  Other:      Therapist: West Domingo DPT    Date: 2/21/2022 Time: 1:22 PM     Future Appointments   Date Time Provider Afsaneh Angeles   2/25/2022  1:15 PM [facility]   2/28/2022  1:15 PM Gael Flint [facility]   3/7/2022 11:20 AM Tomi Moran, ROSALIE Carrera

## 2022-02-25 ENCOUNTER — HOSPITAL ENCOUNTER (OUTPATIENT)
Dept: PHYSICAL THERAPY | Age: 77
Discharge: HOME | End: 2022-02-25
Payer: MEDICARE

## 2022-02-25 PROCEDURE — 97110 THERAPEUTIC EXERCISES: CPT

## 2022-02-25 NOTE — PROGRESS NOTES
PHYSICAL THERAPY - DAILY TREATMENT NOTE    Patient Name: Ana Damon        Date: 2022  : 1945   YES Patient  Verified  Visit #:     Insurance: Payor: Charlie Hamm / Plan: [place] MEDICARE PART A & B / Product Type: Medicare /      In time: 115 Out time: 200   Total Treatment Time: 45     Medicare/BCBS Time Tracking (below)   Total Timed Codes (min):  45 1:1 Treatment Time:  45     TREATMENT AREA =  Right knee pain [M25.561]    SUBJECTIVE    Pain Level (on 0 to 10 scale):  0  / 10   Medication Changes/New allergies or changes in medical history, any new surgeries or procedures?     NO    If yes, update Summary List   Subjective Functional Status/Changes:  []  No changes reported     Current Pain: 0/10  Lowest Pain: 0/10  Worst Pain: 2/10           OBJECTIVE  Therapeutic Procedures:  Min Procedure Specifics + Rationale   45(45) [x] Therapeutic Exercise    See Flowsheet   Rationale: increase ROM and increase strength to improve the patients ability to participate in ADL's              min Patient Education:  YES Reviewed HEP         Other Objective/Functional Measures:    See flowsheet for more details  See PN       VC and demos required throughout session for proper form and increased safety         Post Treatment Pain Level (on 0 to 10) scale:   0  / 10     ASSESSMENT    Assessment/Changes in Function:     Gait:  Independent without AD                                  AROM PROM Strength   Hip Flexion (0-120) NT NT  5   Knee Flexion (0-135) 108 115  5     Extension (0)  4 2  5   Ankle Plantarflexion (0-50) WFL  NT   5     Dorsiflexion (0-20)  WFL  NT 5         []  See Progress Note/Recertification   Patient will continue to benefit from skilled PT services to analyze,, cue,, progress,, modify,, demonstrate,, instruct, and address, movement patterns,, therapeutic interventions,, postural abnormalities,, soft tissue restrictions,, ROM,, strength,, functional mobility,, body mechanics/ergonomics, and home and community integration, to attain remaining goals.    Progress toward goals / Updated goals:    See PN     PLAN    [x]  Upgrade activities as tolerated YES Continue plan of care   []  Discharge due to :    []  Other:      Therapist: West Domingo DPT    Date: 2/25/2022 Time: 12:06 PM     Future Appointments   Date Time Provider Afsaneh Angeles   2/25/2022  1:15 PM Gael Flint [facility]   2/28/2022  1:15 PM Gael Flint [facility]   3/7/2022 11:20 AM Luisa, Tomi You, NP [address] [facility]

## 2022-02-25 NOTE — PROGRESS NOTES
[facility] PHYSICAL THERAPY  [address] [facility] [place] [address], [address] - Phone: [phone]  Fax: [phone]  Progress Note/CONTINUED R [address] for PHYSICAL THERAPY          Patient Name: Marie Espinosa : 1945   Treatment/Medical Diagnosis: Right knee pain [M25.561]   Referral Source: Hilda Mathew MD Start of [facility]): 2022   Prior Hospitalization: See Medical History Provider #:  [id number]   Medications: Verified on Patient Summary List   Visits from [facility]: 15 Missed Visits: 1   GOALS               Goal/Measure of Progress Goal Met? 1.  Pt will be independent with HEP at D/C for self management. Status at last Eval: Min VC Current Status: Min VC NOT MET   2.  Pt will demonstrate knee flexion PROM to at [place] deg to improve stair negotiation. Status at last Eval: 95 deg Current Status: 115 deg MET   3.  Pt will report being able to ambulate for 1 mile to improve quality of life. Status at last Eval: 1/2 mile Current Status: Not limited  MET              4.  Pt will increase FOTO Functional Status score to 67 to decrease functional limitations. Status at last Eval: 53/100 Current Status: 67/100 MET   5.  Pt will demonstrate knee extension AROM to at least 2 deg from neutral to improve stance phase. Status at last Eval: Lacking 5 deg Current Status: Lacking 4 deg NOT MET      SUMMARY OF TREATMENT  Patient's POC has consisted of therex, therapeutic activities, manual therapy prn, modalities prn, pt. education, and a comprehensive HEP. Treatment strategies used to address functional mobility deficits, ROM deficits, strength deficits, analyze and address soft tissue restrictions, analyze and cue movement patterns, analyze and modify body mechanics/ergonomics, assess and modify postural abnormalities and instruct in home and community integration. CURRENT STATUS  Gibran Davila reports global rating of change of 6 out of 7.  She is able to perform most ADL's without difficulty and can walk a mile. She report difficulty with descending stairs and lack of knee extension AROM. Her knee extension has made minimal improvement since last progress note. Flexion has improved by 13 degrees in the past month. Pt rarely reports pain since injection. Current Pain: 0/10  Lowest Pain: 0/10  Worst Pain: 2/10    Gait:  Independent without AD                                  AROM PROM Strength   Hip Flexion (0-120) NT NT  5   Knee Flexion (0-135) 108 115  5     Extension (0)  4 2  5   Ankle Plantarflexion (0-50) WFL  NT   5     Dorsiflexion (0-20)  WFL  NT 5      New Goals to be achieved in   2-4  weeks:  1. Pt will demonstrate knee extension AROM to at least 2 deg from neutral to improve stance phase. 2. Pt will be independent with HEP at D/C for self management. 5. Pt will report minimal/no difficulty with descending stairs. to decrease functional limitations. Frequency / Duration:   Patient to be seen   2-1   times per week for   2-4    weeks:    RECOMMENDATIONS: Continue and progress functional therex/therapeutic activity as able, utilizing manual therapy and modalities prn. Progress patient towards independent HEP to facilitate self-management of symptoms and progress gains after PT. If you have any questions/comments please contact us directly at [phone]. Thank you for allowing us to assist in the care of your patient. Therapist Signature: Saroj Funk DPT Date: 6/05/6884   Certification Period:  Reporting Period: 12/22/2021 - 3/21/2022  12/22/2021 - 2/25/2022 Time: 1:42 PM   NOTE TO PHYSICIAN:  PLEASE COMPLETE THE ORDERS BELOW AND FAX TO   [facility] [place] [facility]: [phone]   If you are unable to process this request in 24 hours please contact our office: [phone].    ___ I have read the above report and request that my patient continue as recommended.   ___ I have read the above report and request that my patient continue therapy with the following changes/special instructions: ________________________________________________   ___ I have read the above report and request that my patient be discharged from therapy.      Physician Signature:        Date:       Time:    Rebekah Landers MD

## 2022-02-28 ENCOUNTER — HOSPITAL ENCOUNTER (OUTPATIENT)
Dept: PHYSICAL THERAPY | Age: 77
Discharge: HOME | End: 2022-02-28
Payer: MEDICARE

## 2022-02-28 PROCEDURE — 97110 THERAPEUTIC EXERCISES: CPT

## 2022-03-02 ENCOUNTER — HOSPITAL ENCOUNTER (OUTPATIENT)
Dept: PHYSICAL THERAPY | Age: 77
Discharge: HOME | End: 2022-03-02
Payer: MEDICARE

## 2022-03-02 PROCEDURE — 97110 THERAPEUTIC EXERCISES: CPT

## 2022-03-02 NOTE — PROGRESS NOTES
PHYSICAL THERAPY - DAILY TREATMENT NOTE    Patient Name: Lenny Gooden        Date: 3/2/2022  : 1945   YES Patient  Verified  Visit #:     Insurance: Payor: Nay  / Plan: [place] MEDICARE PART A & B / Product Type: Medicare /      In time: 155 Out time: 240   Total Treatment Time: 45     Medicare/BCBS Time Tracking (below)   Total Timed Codes (min):  45 1:1 Treatment Time:  45     TREATMENT AREA =  Right knee pain [M25.561]    SUBJECTIVE    Pain Level (on 0 to 10 scale):  0  / 10   Medication Changes/New allergies or changes in medical history, any new surgeries or procedures? NO    If yes, update Summary List   Subjective Functional Status/Changes:  []  No changes reported     Amelie's only complaints today are frequent stiffness in right knee          OBJECTIVE  Therapeutic Procedures:  Min Procedure Specifics + Rationale   45(45) [x] Therapeutic Exercise    See Flowsheet   Rationale: increase ROM and increase strength to improve the patients ability to participate in ADL's         min Patient Education:  YES Reviewed HEP         Other Objective/Functional Measures:    See flowsheet for more details    Patient Education regarding the following during session:  1.on importance of HEP compliance to continue to improve ROM and that sessions will focus more on improving strength and motor control. Min/mod VC and demos required throughout session for proper form and increased safety         Post Treatment Pain Level (on 0 to 10) scale:   0  / 10     ASSESSMENT    Assessment/Changes in Function:     Pt demonstrates decreased stability during lateral lilian step overs. She experienced 3 instances of brief loss of balance with independent recovery.       []  See Progress Note/Recertification   Patient will continue to benefit from skilled PT services to analyze,, cue,, progress,, modify,, demonstrate,, instruct, and address, movement patterns,, therapeutic interventions,, postural abnormalities,, soft tissue restrictions,, ROM,, strength,, functional mobility,, body mechanics/ergonomics, and home and community integration, to attain remaining goals. Progress toward goals / Updated goals:    1. Pt will demonstrate knee extension AROM to at least 2 deg from neutral to improve stance phase. 2. Pt will be independent with HEP at D/C for self management. 3. Pt will report minimal/no difficulty with descending stairs. to decrease functional limitations.   Added Leg press     PLAN    [x]  Upgrade activities as tolerated YES Continue plan of care   []  Discharge due to :    []  Other:      Therapist: Agata Paul DPT    Date: 3/2/2022 Time: 2:02 PM     Future Appointments   Date Time Provider Afsaneh Angeles   3/7/2022 11:20 AM [facility], NP [address] [facility]   3/7/2022  2:00 PM [address] [facility]   3/9/2022  1:15 PM [address], [address]

## 2022-03-07 ENCOUNTER — APPOINTMENT (OUTPATIENT)
Dept: PHYSICAL THERAPY | Age: 77
End: 2022-03-07
Payer: MEDICARE

## 2022-03-09 ENCOUNTER — HOSPITAL ENCOUNTER (OUTPATIENT)
Dept: PHYSICAL THERAPY | Age: 77
Discharge: HOME | End: 2022-03-09
Payer: MEDICARE

## 2022-03-09 PROCEDURE — 97110 THERAPEUTIC EXERCISES: CPT

## 2022-03-09 NOTE — PROGRESS NOTES
PHYSICAL THERAPY - DAILY TREATMENT NOTE    Patient Name: Mohini Wolfe        Date: 3/9/2022  : 1945   YES Patient  Verified  Visit #:     Insurance: Payor: Ashlee Beatris / Plan: [place] MEDICARE PART A & B / Product Type: Medicare /    In time: 115 Out time: 200   Total Treatment Time: 45     Medicare/BCBS Time Tracking (below)   Total Timed Codes (min):  45 1:1 Treatment Time:  45     TREATMENT AREA =  Right knee pain [M25.561]  SUBJECTIVE  Pain Level (on 0 to 10 scale):  0  / 10   Medication Changes/New allergies or changes in medical history, any new surgeries or procedures? NO    If yes, update Summary List   Subjective Functional Status/Changes:  []  No changes reported     Vicenta reports 80% improvement since start of care.      Current Pain: 0/10  Lowest Pain: 0/10  Worst Pain: 2/10    Current Limitations: Descending Stairs (2/10) and Reports of Instability    Compliance with HEP  Yes [x] No []         OBJECTIVE  Therapeutic Procedures:  Min Procedure Specifics + Rationale   45(45) [x] Therapeutic Exercise    See Flowsheet   Rationale: increase ROM and increase strength to improve the patients ability to participate in ADL's         min Patient Education:  YES Reviewed HEP         Other Objective/Functional Measures:    See flowsheet for more details      AROM   Hip Flexion (0-120) NT   Knee Flexion (0-135) 105     Extension (0)  7   Ankle Plantarflexion (0-50) WFL      Dorsiflexion (0-20)  WFL      Post Treatment Pain Level (on 0 to 10) scale:   0  / 10     ASSESSMENT    Assessment/Changes in Function:     See DC     []  See Progress Note/Recertification   Patient will continue to benefit from skilled PT services to analyze,, cue,, progress,, modify,, demonstrate,, instruct, and address, movement patterns,, therapeutic interventions,, postural abnormalities,, soft tissue restrictions,, ROM,, strength,, functional mobility,, body mechanics/ergonomics, and home and community integration, to attain remaining goals.    Progress toward goals / Updated goals:  See DC     PLAN  [x]  Upgrade activities as tolerated NO Continue plan of care   []  Discharge due to :    []  Other:      Therapist: Enrico Torres DPT    Date: 3/9/2022 Time: 1:45 PM     Future Appointments   Date Time Provider Afsaneh Angeles   6/1/2022  1:00 PM [facility], NP [address]

## 2022-07-21 NOTE — PROGRESS NOTES
PHYSICAL THERAPY - DAILY TREATMENT NOTE    Patient Name: Jennifer Osullivan        Date: 2022  : 1945   YES Patient  Verified  Visit #:     Insurance: Payor: Jose Donald / Plan: [place] MEDICARE PART A & B / Product Type: Medicare /      In time: 112 Out time: 157   Total Treatment Time: 45     Medicare/BCBS Time Tracking (below)   Total Timed Codes (min):  45 1:1 Treatment Time:  45     TREATMENT AREA =  Right knee pain [M25.561]    SUBJECTIVE    Pain Level (on 0 to 10 scale):  0  / 10   Medication Changes/New allergies or changes in medical history, any new surgeries or procedures? NO    If yes, update Summary List   Subjective Functional Status/Changes:  []  No changes reported     Elier Abbott denies any resting right knee pain and would like to schedule 3 additional appointments. Compliance with HEP  Yes [x] No []         OBJECTIVE  Therapeutic Procedures:  Min Procedure Specifics + Rationale   45(45) [x] Therapeutic Exercise    See Flowsheet   Rationale: increase ROM and increase strength to improve the patients ability to participate in ADL's              min Patient Education:  YES Reviewed HEP         Other Objective/Functional Measures:    See flowsheet for more details    Added Bosu step ups and DB RDL's    min VC and demos required throughout session for proper form and increased safety         Post Treatment Pain Level (on 0 to 10) scale:   0  / 10     ASSESSMENT    Assessment/Changes in Function:     Elier Abbott demonstrates difficulty with split squats and bosu step ups secondary to decreased balance.       []  See Progress Note/Recertification   Patient will continue to benefit from skilled PT services to analyze,, cue,, progress,, modify,, demonstrate,, instruct, and address, movement patterns,, therapeutic interventions,, postural abnormalities,, soft tissue restrictions,, ROM,, strength,, functional mobility,, body mechanics/ergonomics, and home and community integration, to attain remaining goals.    Progress toward goals / Updated goals:    1st session since progress note, no notable progress yet     PLAN    [x]  Upgrade activities as tolerated YES Continue plan of care   []  Discharge due to :    []  Other:      Therapist: Jenni Emerson DPT    Date: 2/28/2022 Time: 1:17 PM     Future Appointments   Date Time Provider Afsaneh Angeles   3/7/2022 11:20 AM [facility], NP [address] [facility] No

## (undated) DEVICE — BLADE SAW W13XL90MM 1.19MM PARA

## (undated) DEVICE — PIN GUIDE FIX 3.2X62 MM SCREW [GS903A0620322P] [KOMET MEDICAL]

## (undated) DEVICE — CONTAINER PREFIL FRMLN 15ML --

## (undated) DEVICE — SUTURE STRATAFIX SYMMETRIC PDS + 1 SGL ARMED CT 18 IN LEN SXPP1A405

## (undated) DEVICE — PIN GUIDE FIX 3.2X62 MM SCREW [GS9030620324P] [KOMET MEDICAL]

## (undated) DEVICE — DRSG MEPILES BORDER AG 4X12 -- 5/BX

## (undated) DEVICE — KIT COLON W/ 1.1OZ LUB AND 2 END

## (undated) DEVICE — Device

## (undated) DEVICE — SOL IRRIGATION INJ NACL 0.9% 500ML BTL

## (undated) DEVICE — SOL INJ L R 1000ML BG --

## (undated) DEVICE — SOLUTION SCRB 4OZ 10% PVP I POVIDONE IOD TOP PAINT EXIDINE

## (undated) DEVICE — KENDALL 500 SERIES DIAPHORETIC FOAM MONITORING ELECTRODE - TEAR DROP SHAPE ( 30/PK): Brand: KENDALL

## (undated) DEVICE — BASIN EMESIS 500CC ROSE 250/CS 60/PLT: Brand: MEDEGEN MEDICAL PRODUCTS, LLC

## (undated) DEVICE — NEEDLE SPNL 20GA L3.5IN YEL HUB S STL REG WALL FIT STYL W/

## (undated) DEVICE — DEVICE INFL 60ML 12ATM CONVENIENT LOK REL HNDL HI PRSS FLX

## (undated) DEVICE — FLEX ADVANTAGE 1500CC: Brand: FLEX ADVANTAGE

## (undated) DEVICE — MEDI-VAC NON-CONDUCTIVE SUCTION TUBING: Brand: CARDINAL HEALTH

## (undated) DEVICE — THE CANADY HYBRID PLASMA SCALPEL IS AN ELECTROSURGICAL PLASMA SCALPEL THAT USES AN 85MM BENDABLE PADDLE BLADE TIP. THE ELECTROSURGICAL PLASMA SCALPEL IS USED TO SIMULTANEOUSLY CUT AND COAGULATE BIOLOGICAL TISSUE.: Brand: CANADY HYBRID PLASMA PADDLE BLADE

## (undated) DEVICE — TRAP SPEC COLL POLYP POLYSTYR --

## (undated) DEVICE — NDL SPNE QNCKE 18GX3.5IN LF --

## (undated) DEVICE — SNARE ENDO 2.4MMX230CM -- COLD EXACTO

## (undated) DEVICE — 3 BONE CEMENT MIXER: Brand: MIXEVAC

## (undated) DEVICE — SUT VCRL + 2-0 36IN CT1 UD --

## (undated) DEVICE — SYR 50ML SLIP TIP NSAF LF STRL --

## (undated) DEVICE — HANDPIECE SET WITH HIGH FLOW TIP AND SUCTION TUBE: Brand: INTERPULSE

## (undated) DEVICE — ZIP 16 SURGICAL SKIN CLOSURE DEVICE: Brand: ZIP 16 SURGICAL SKIN CLOSURE DEVICE

## (undated) DEVICE — SOLUTION IV 100ML 0.9% SOD CHL DIL INJ

## (undated) DEVICE — BLADE SAW 1.19X20X90 MM FOR LG BNE

## (undated) DEVICE — SUT VCRL + 1 36IN CT1 VIO --